# Patient Record
Sex: FEMALE | Race: BLACK OR AFRICAN AMERICAN | Employment: FULL TIME | URBAN - METROPOLITAN AREA
[De-identification: names, ages, dates, MRNs, and addresses within clinical notes are randomized per-mention and may not be internally consistent; named-entity substitution may affect disease eponyms.]

---

## 2021-10-22 ENCOUNTER — TRANSCRIBE ORDER (OUTPATIENT)
Dept: SCHEDULING | Age: 66
End: 2021-10-22

## 2021-10-22 DIAGNOSIS — M54.50 LOW BACK PAIN: Primary | ICD-10-CM

## 2021-10-28 ENCOUNTER — HOSPITAL ENCOUNTER (OUTPATIENT)
Dept: MRI IMAGING | Age: 66
Discharge: HOME | End: 2021-10-28
Attending: INTERNAL MEDICINE
Payer: COMMERCIAL

## 2021-10-28 DIAGNOSIS — M54.50 LOW BACK PAIN: ICD-10-CM

## 2021-10-28 PROCEDURE — 72148 MRI LUMBAR SPINE W/O DYE: CPT

## 2022-11-18 ENCOUNTER — TRANSCRIBE ORDER (OUTPATIENT)
Dept: SCHEDULING | Age: 67
End: 2022-11-18

## 2022-11-18 DIAGNOSIS — M54.16 LUMBAR RADICULOPATHY: ICD-10-CM

## 2022-11-18 DIAGNOSIS — M25.551 RIGHT HIP PAIN: Primary | ICD-10-CM

## 2022-12-02 ENCOUNTER — HOSPITAL ENCOUNTER (OUTPATIENT)
Dept: MRI IMAGING | Age: 67
End: 2022-12-02
Attending: INTERNAL MEDICINE
Payer: COMMERCIAL

## 2022-12-02 DIAGNOSIS — M25.551 RIGHT HIP PAIN: ICD-10-CM

## 2022-12-02 DIAGNOSIS — M54.16 LUMBAR RADICULOPATHY: ICD-10-CM

## 2022-12-02 PROCEDURE — 73721 MRI JNT OF LWR EXTRE W/O DYE: CPT

## 2022-12-02 PROCEDURE — 72148 MRI LUMBAR SPINE W/O DYE: CPT

## 2023-02-23 NOTE — PROGRESS NOTES
The patient was provided a virtual link to view the pre-operative Spine Class. A pre-operative Patient education booklet specific to spine surgery was given to the patient in PAT. The content of the class was presented using an audio power point presentation specific for patients undergoing spine surgery. Incentive spirometer and CHG bath kits were verbally reviewed. Day of surgery routine and expectations, hospital routine and expectations, nutrition, alcohol, nicotine, medications, infection control, pain management, DVT precautions and equipment, ice therapy, durable medical equipment, exercises, mobility expectations and precautions, home preparation and safety were reviewed in class. My contact information was shared with the patient to provide further information as requested by the patient related to their upcoming surgery. Received confirmation that the patient viewed spine class online via the [employer] pre-op education video survey. Questions answered via email.

## 2023-03-07 ENCOUNTER — HOSPITAL ENCOUNTER (OUTPATIENT)
Dept: PREADMISSION TESTING | Age: 68
Discharge: HOME | End: 2023-03-07

## 2023-03-07 VITALS
SYSTOLIC BLOOD PRESSURE: 138 MMHG | RESPIRATION RATE: 16 BRPM | OXYGEN SATURATION: 94 % | TEMPERATURE: 97.3 F | BODY MASS INDEX: 25.51 KG/M2 | DIASTOLIC BLOOD PRESSURE: 83 MMHG | HEART RATE: 79 BPM | WEIGHT: 158.73 LBS | HEIGHT: 66 IN

## 2023-03-07 LAB
25(OH)D3 SERPL-MCNC: 17.7 NG/ML (ref 30–100)
ABO + RH BLD: NORMAL
ALBUMIN SERPL-MCNC: 4 G/DL (ref 3.5–5)
ALBUMIN/GLOB SERPL: 1.2 (ref 1.1–2.2)
ALP SERPL-CCNC: 92 U/L (ref 45–117)
ALT SERPL-CCNC: 16 U/L (ref 12–78)
ANION GAP SERPL CALC-SCNC: 2 MMOL/L (ref 5–15)
APPEARANCE UR: CLEAR
APTT PPP: 25.6 SEC (ref 22.1–31)
AST SERPL-CCNC: 15 U/L (ref 15–37)
ATRIAL RATE: 69 BPM
BACTERIA URNS QL MICRO: NEGATIVE /HPF
BASOPHILS # BLD: 0 K/UL (ref 0–0.1)
BASOPHILS NFR BLD: 0 % (ref 0–1)
BILIRUB SERPL-MCNC: 0.3 MG/DL (ref 0.2–1)
BILIRUB UR QL: NEGATIVE
BLOOD GROUP ANTIBODIES SERPL: NORMAL
BUN SERPL-MCNC: 10 MG/DL (ref 6–20)
BUN/CREAT SERPL: 13 (ref 12–20)
CALCIUM SERPL-MCNC: 9.1 MG/DL (ref 8.5–10.1)
CALCULATED P AXIS, ECG09: 71 DEGREES
CALCULATED R AXIS, ECG10: -73 DEGREES
CALCULATED T AXIS, ECG11: 38 DEGREES
CHLORIDE SERPL-SCNC: 113 MMOL/L (ref 97–108)
CO2 SERPL-SCNC: 26 MMOL/L (ref 21–32)
COLOR UR: NORMAL
CREAT SERPL-MCNC: 0.8 MG/DL (ref 0.55–1.02)
DIAGNOSIS, 93000: NORMAL
DIFFERENTIAL METHOD BLD: ABNORMAL
EOSINOPHIL # BLD: 0.1 K/UL (ref 0–0.4)
EOSINOPHIL NFR BLD: 2 % (ref 0–7)
EPITH CASTS URNS QL MICRO: NORMAL /LPF
ERYTHROCYTE [DISTWIDTH] IN BLOOD BY AUTOMATED COUNT: 12.6 % (ref 11.5–14.5)
EST. AVERAGE GLUCOSE BLD GHB EST-MCNC: 108 MG/DL
GLOBULIN SER CALC-MCNC: 3.3 G/DL (ref 2–4)
GLUCOSE SERPL-MCNC: 95 MG/DL (ref 65–100)
GLUCOSE UR STRIP.AUTO-MCNC: NEGATIVE MG/DL
HBA1C MFR BLD: 5.4 % (ref 4–5.6)
HCT VFR BLD AUTO: 45.1 % (ref 35–47)
HGB BLD-MCNC: 15 G/DL (ref 11.5–16)
HGB UR QL STRIP: NEGATIVE
HYALINE CASTS URNS QL MICRO: NORMAL /LPF (ref 0–2)
IMM GRANULOCYTES # BLD AUTO: 0 K/UL (ref 0–0.04)
IMM GRANULOCYTES NFR BLD AUTO: 0 % (ref 0–0.5)
INR PPP: 1 (ref 0.9–1.1)
KETONES UR QL STRIP.AUTO: NEGATIVE MG/DL
LEUKOCYTE ESTERASE UR QL STRIP.AUTO: NEGATIVE
LYMPHOCYTES # BLD: 2.6 K/UL (ref 0.8–3.5)
LYMPHOCYTES NFR BLD: 52 % (ref 12–49)
MCH RBC QN AUTO: 35 PG (ref 26–34)
MCHC RBC AUTO-ENTMCNC: 33.3 G/DL (ref 30–36.5)
MCV RBC AUTO: 105.1 FL (ref 80–99)
MONOCYTES # BLD: 0.6 K/UL (ref 0–1)
MONOCYTES NFR BLD: 12 % (ref 5–13)
NEUTS SEG # BLD: 1.7 K/UL (ref 1.8–8)
NEUTS SEG NFR BLD: 34 % (ref 32–75)
NITRITE UR QL STRIP.AUTO: NEGATIVE
NRBC # BLD: 0 K/UL (ref 0–0.01)
NRBC BLD-RTO: 0 PER 100 WBC
P-R INTERVAL, ECG05: 202 MS
PH UR STRIP: 7 (ref 5–8)
PLATELET # BLD AUTO: 217 K/UL (ref 150–400)
PMV BLD AUTO: 8.9 FL (ref 8.9–12.9)
POTASSIUM SERPL-SCNC: 4.4 MMOL/L (ref 3.5–5.1)
PROT SERPL-MCNC: 7.3 G/DL (ref 6.4–8.2)
PROT UR STRIP-MCNC: NEGATIVE MG/DL
PROTHROMBIN TIME: 10.3 SEC (ref 9–11.1)
Q-T INTERVAL, ECG07: 378 MS
QRS DURATION, ECG06: 104 MS
QTC CALCULATION (BEZET), ECG08: 405 MS
RBC # BLD AUTO: 4.29 M/UL (ref 3.8–5.2)
RBC #/AREA URNS HPF: NORMAL /HPF (ref 0–5)
SODIUM SERPL-SCNC: 141 MMOL/L (ref 136–145)
SP GR UR REFRACTOMETRY: 1.01 (ref 1–1.03)
SPECIMEN EXP DATE BLD: NORMAL
THERAPEUTIC RANGE,PTTT: NORMAL SECS (ref 58–77)
UA: UC IF INDICATED,UAUC: NORMAL
UROBILINOGEN UR QL STRIP.AUTO: 0.2 EU/DL (ref 0.2–1)
VENTRICULAR RATE, ECG03: 69 BPM
WBC # BLD AUTO: 5 K/UL (ref 3.6–11)
WBC URNS QL MICRO: NORMAL /HPF (ref 0–4)

## 2023-03-07 PROCEDURE — 85025 COMPLETE CBC W/AUTO DIFF WBC: CPT

## 2023-03-07 PROCEDURE — 93005 ELECTROCARDIOGRAM TRACING: CPT

## 2023-03-07 PROCEDURE — 82306 VITAMIN D 25 HYDROXY: CPT

## 2023-03-07 PROCEDURE — 85730 THROMBOPLASTIN TIME PARTIAL: CPT

## 2023-03-07 PROCEDURE — 36415 COLL VENOUS BLD VENIPUNCTURE: CPT

## 2023-03-07 PROCEDURE — 85610 PROTHROMBIN TIME: CPT

## 2023-03-07 PROCEDURE — 80053 COMPREHEN METABOLIC PANEL: CPT

## 2023-03-07 PROCEDURE — 86900 BLOOD TYPING SEROLOGIC ABO: CPT

## 2023-03-07 PROCEDURE — 83036 HEMOGLOBIN GLYCOSYLATED A1C: CPT

## 2023-03-07 PROCEDURE — 81001 URINALYSIS AUTO W/SCOPE: CPT

## 2023-03-07 RX ORDER — ESCITALOPRAM OXALATE 10 MG/1
10 TABLET ORAL DAILY
COMMUNITY

## 2023-03-07 RX ORDER — IBUPROFEN 600 MG/1
TABLET ORAL 3 TIMES DAILY
COMMUNITY

## 2023-03-07 RX ORDER — CHOLECALCIFEROL TAB 125 MCG (5000 UNIT) 125 MCG
10000 TAB ORAL DAILY
Qty: 60 TABLET | Refills: 0 | Status: SHIPPED
Start: 2023-03-07 | End: 2023-04-06

## 2023-03-07 RX ORDER — BUTALBITAL AND ACETAMINOPHEN 325; 50 MG/1; MG/1
1 TABLET ORAL AS NEEDED
COMMUNITY

## 2023-03-07 RX ORDER — MELATONIN
1000 DAILY
COMMUNITY

## 2023-03-07 RX ORDER — AMLODIPINE BESYLATE 5 MG/1
5 TABLET ORAL DAILY
COMMUNITY

## 2023-03-07 RX ORDER — GABAPENTIN 300 MG/1
300 CAPSULE ORAL 2 TIMES DAILY
COMMUNITY

## 2023-03-07 NOTE — PERIOP NOTES
[address] , [address] [facility]   MAIN OR                                  [phone]   MAIN PRE OP                          [phone]                                                                                AMBULATORY PRE OP          [phone]  PRE-ADMISSION TESTING    [phone]   Surgery Date:  3/20/23       Is surgery arrival time given by surgeon? YES  NO  If NO, [facility] staff will call you between 3 and 7pm the day before your surgery with your arrival time. (If your surgery is on a Monday, we will call you the Friday before.)    Call [phone] after 7pm Monday-Friday if you did not receive this call. INSTRUCTIONS BEFORE YOUR SURGERY   When You  Arrive Arrive at the [address] [facility] on the day of your surgery  Have your insurance card, photo ID, and any copayment (if needed)   Food   and   Drink NO food or drink after midnight the night before surgery    This means NO water, gum, mints, coffee, juice, etc.  No alcohol (beer, wine, liquor) 24 hours before and after surgery   Medications to   TAKE   Morning of Surgery MEDICATIONS TO TAKE THE MORNING OF SURGERY WITH A SIP OF WATER:   Amlodipine  Escitalopram  neurontin   Medications  To  STOP      7 days before surgery Non-Steroidal anti-inflammatory Drugs (NSAID's): for example, Ibuprofen (Advil, Motrin), Naproxen (Aleve)  Aspirin, if taking for pain   Herbal supplements, vitamins, and fish oil  Other:  (Pain medications not listed above, including Tylenol may be taken)   Blood  Thinners If you take  Aspirin, Plavix, Coumadin, or any blood-thinning or anti-blood clot medicine, talk to the doctor who prescribed the medications for pre-operative instructions.    Bathing Clothing  Jewelry  Valuables     If you shower the morning of surgery, please do not apply anything to your skin (lotions, powders, deodorant, or makeup, especially mascara)  Follow Chlorhexidine Care Fusion body wash instructions provided to you during PAT appointment. Begin 3 days prior to surgery. Do not shave or trim anywhere 24 hours before surgery  Wear your hair loose or down; no pony-tails, buns, or metal hair clips  Wear loose, comfortable, clean clothes  Wear glasses instead of contacts  Leave money, valuables, and jewelry, including body piercings, at home  If you were given an [employer], bring it on day of surgery. Going Home - or Spending the Night SAME-DAY SURGERY: You must have a responsible adult drive you home and stay with you 24 hours after surgery  ADMITS: If your doctor is keeping you in the hospital after surgery, leave personal belongings/luggage in your car until you have a hospital room number. Hospital discharge time is 12 noon  Drivers must be here before 12 noon unless you are told differently   Special Instructions Please bring covid bvaccine card day of surgery       Follow all instructions so your surgery wont be cancelled. Please, be on time. If a situation occurs and you are delayed the day of surgery, call [phone]    If your physical condition changes (like a fever, cold, flu, etc.) call your surgeon. Home medication(s) reviewed and verified via     LIST   VERBAL   during PAT appointment. The patient was contacted by     IN-PERSON  The patient verbalizes understanding of all instructions and      DOES NOT   need reinforcement.

## 2023-03-07 NOTE — PERIOP NOTES
Vitamin D level 17.7 in PAT; patient to start 81665 units of Vitamin D daily for 30 days; available OTC.

## 2023-03-08 LAB
BACTERIA SPEC CULT: NORMAL
BACTERIA SPEC CULT: NORMAL
SERVICE CMNT-IMP: NORMAL

## 2023-03-10 NOTE — PERIOP NOTES
Per request of Paul Chen, patient called and informed of Vit D level. Instructed to take 10,000 unit daily for 30 days. Verbalized understanding. Will follow up with PCP at next visit.

## 2023-03-17 ENCOUNTER — ANESTHESIA EVENT (OUTPATIENT)
Dept: SURGERY | Age: 68
DRG: 453 | End: 2023-03-17
Payer: COMMERCIAL

## 2023-03-20 ENCOUNTER — HOSPITAL ENCOUNTER (INPATIENT)
Age: 68
LOS: 5 days | Discharge: HOME HEALTH | DRG: 453 | End: 2023-03-25
Attending: ORTHOPAEDIC SURGERY | Admitting: ORTHOPAEDIC SURGERY
Payer: COMMERCIAL

## 2023-03-20 ENCOUNTER — ANESTHESIA (OUTPATIENT)
Dept: SURGERY | Age: 68
DRG: 453 | End: 2023-03-20
Payer: COMMERCIAL

## 2023-03-20 ENCOUNTER — APPOINTMENT (OUTPATIENT)
Dept: GENERAL RADIOLOGY | Age: 68
DRG: 453 | End: 2023-03-20
Attending: ORTHOPAEDIC SURGERY
Payer: COMMERCIAL

## 2023-03-20 DIAGNOSIS — G89.18 POSTOPERATIVE PAIN: ICD-10-CM

## 2023-03-20 DIAGNOSIS — Z98.1 STATUS POST LUMBAR SPINAL FUSION: Primary | ICD-10-CM

## 2023-03-20 PROBLEM — M48.061 LUMBAR STENOSIS: Status: ACTIVE | Noted: 2023-03-20

## 2023-03-20 PROCEDURE — 77030026438 HC STYL ET INTUB CARD -A: Performed by: NURSE ANESTHETIST, CERTIFIED REGISTERED

## 2023-03-20 PROCEDURE — 74011250636 HC RX REV CODE- 250/636: Performed by: NURSE ANESTHETIST, CERTIFIED REGISTERED

## 2023-03-20 PROCEDURE — 0SB20ZZ EXCISION OF LUMBAR VERTEBRAL DISC, OPEN APPROACH: ICD-10-PCS | Performed by: ORTHOPAEDIC SURGERY

## 2023-03-20 PROCEDURE — 77030004391 HC BUR FLUT MEDT -C: Performed by: ORTHOPAEDIC SURGERY

## 2023-03-20 PROCEDURE — 65270000029 HC RM PRIVATE

## 2023-03-20 PROCEDURE — C1713 ANCHOR/SCREW BN/BN,TIS/BN: HCPCS | Performed by: ORTHOPAEDIC SURGERY

## 2023-03-20 PROCEDURE — 77030005513 HC CATH URETH FOL11 MDII -B: Performed by: ORTHOPAEDIC SURGERY

## 2023-03-20 PROCEDURE — 77030038692 HC WND DEB SYS IRMX -B: Performed by: ORTHOPAEDIC SURGERY

## 2023-03-20 PROCEDURE — 01NB0ZZ RELEASE LUMBAR NERVE, OPEN APPROACH: ICD-10-PCS | Performed by: ORTHOPAEDIC SURGERY

## 2023-03-20 PROCEDURE — C1821 INTERSPINOUS IMPLANT: HCPCS | Performed by: ORTHOPAEDIC SURGERY

## 2023-03-20 PROCEDURE — 74011000250 HC RX REV CODE- 250: Performed by: ORTHOPAEDIC SURGERY

## 2023-03-20 PROCEDURE — 77030026188 HC BN CANC CHP CRSH PR LIFV -E: Performed by: ORTHOPAEDIC SURGERY

## 2023-03-20 PROCEDURE — C1762 CONN TISS, HUMAN(INC FASCIA): HCPCS | Performed by: ORTHOPAEDIC SURGERY

## 2023-03-20 PROCEDURE — 77030031139 HC SUT VCRL2 J&J -A: Performed by: ORTHOPAEDIC SURGERY

## 2023-03-20 PROCEDURE — 74011250637 HC RX REV CODE- 250/637: Performed by: ORTHOPAEDIC SURGERY

## 2023-03-20 PROCEDURE — 74011250636 HC RX REV CODE- 250/636: Performed by: ORTHOPAEDIC SURGERY

## 2023-03-20 PROCEDURE — C1768 GRAFT, VASCULAR: HCPCS | Performed by: ORTHOPAEDIC SURGERY

## 2023-03-20 PROCEDURE — 77030040361 HC SLV COMPR DVT MDII -B

## 2023-03-20 PROCEDURE — 0SG10AJ FUSION OF 2 OR MORE LUMBAR VERTEBRAL JOINTS WITH INTERBODY FUSION DEVICE, POSTERIOR APPROACH, ANTERIOR COLUMN, OPEN APPROACH: ICD-10-PCS | Performed by: ORTHOPAEDIC SURGERY

## 2023-03-20 PROCEDURE — 76010000175 HC OR TIME 4 TO 4.5 HR INTENSV-TIER 1: Performed by: ORTHOPAEDIC SURGERY

## 2023-03-20 PROCEDURE — 77030040922 HC BLNKT HYPOTHRM STRY -A

## 2023-03-20 PROCEDURE — 77030010507 HC ADH SKN DERMBND J&J -B: Performed by: ORTHOPAEDIC SURGERY

## 2023-03-20 PROCEDURE — 77030013079 HC BLNKT BAIR HGGR 3M -A: Performed by: NURSE ANESTHETIST, CERTIFIED REGISTERED

## 2023-03-20 PROCEDURE — C9290 INJ, BUPIVACAINE LIPOSOME: HCPCS | Performed by: ORTHOPAEDIC SURGERY

## 2023-03-20 PROCEDURE — 76210000006 HC OR PH I REC 0.5 TO 1 HR: Performed by: ORTHOPAEDIC SURGERY

## 2023-03-20 PROCEDURE — 77030020061 HC IV BLD WRMR ADMIN SET 3M -B: Performed by: NURSE ANESTHETIST, CERTIFIED REGISTERED

## 2023-03-20 PROCEDURE — 77030033067 HC SUT PDO STRATFX SPIR J&J -B: Performed by: ORTHOPAEDIC SURGERY

## 2023-03-20 PROCEDURE — 77030012406 HC DRN WND PENRS BARD -A: Performed by: ORTHOPAEDIC SURGERY

## 2023-03-20 PROCEDURE — 74011000250 HC RX REV CODE- 250: Performed by: NURSE ANESTHETIST, CERTIFIED REGISTERED

## 2023-03-20 PROCEDURE — 77030002982 HC SUT POLYSRB J&J -A: Performed by: ORTHOPAEDIC SURGERY

## 2023-03-20 PROCEDURE — 0SG1071 FUSION OF 2 OR MORE LUMBAR VERTEBRAL JOINTS WITH AUTOLOGOUS TISSUE SUBSTITUTE, POSTERIOR APPROACH, POSTERIOR COLUMN, OPEN APPROACH: ICD-10-PCS | Performed by: ORTHOPAEDIC SURGERY

## 2023-03-20 PROCEDURE — 74011250636 HC RX REV CODE- 250/636: Performed by: ANESTHESIOLOGY

## 2023-03-20 PROCEDURE — 77030008684 HC TU ET CUF COVD -B: Performed by: NURSE ANESTHETIST, CERTIFIED REGISTERED

## 2023-03-20 PROCEDURE — 0SG10K1 FUSION OF 2 OR MORE LUMBAR VERTEBRAL JOINTS WITH NONAUTOLOGOUS TISSUE SUBSTITUTE, POSTERIOR APPROACH, POSTERIOR COLUMN, OPEN APPROACH: ICD-10-PCS | Performed by: ORTHOPAEDIC SURGERY

## 2023-03-20 PROCEDURE — 77030028271 HC SRGFL HEMSTAT MTRX KT J&J -C: Performed by: ORTHOPAEDIC SURGERY

## 2023-03-20 PROCEDURE — 2709999900 HC NON-CHARGEABLE SUPPLY: Performed by: ORTHOPAEDIC SURGERY

## 2023-03-20 PROCEDURE — 76060000039 HC ANESTHESIA 4 TO 4.5 HR: Performed by: ORTHOPAEDIC SURGERY

## 2023-03-20 DEVICE — GRAFT BONE SUBSTITUTE 5CC BIOACTIVE NANOSS: Type: IMPLANTABLE DEVICE | Site: SPINE LUMBAR | Status: FUNCTIONAL

## 2023-03-20 DEVICE — ALLOGRAFT BNE MOLD 10 CC VIABLE BNE MTRX VIBONE: Type: IMPLANTABLE DEVICE | Site: SPINE LUMBAR | Status: FUNCTIONAL

## 2023-03-20 DEVICE — SCR BNE SPNE 6.5X50MM TI -- STREAMLINE TL: Type: IMPLANTABLE DEVICE | Site: SPINE LUMBAR | Status: FUNCTIONAL

## 2023-03-20 DEVICE — BONE CHIP CANC CRSH 1-8MM 30ML --: Type: IMPLANTABLE DEVICE | Site: SPINE LUMBAR | Status: FUNCTIONAL

## 2023-03-20 DEVICE — SCR BNE SPNE 6.5X45MM TI -- STREAMLINE TL: Type: IMPLANTABLE DEVICE | Site: SPINE LUMBAR | Status: FUNCTIONAL

## 2023-03-20 DEVICE — INTEGRA® DURAFLEX™ SUTURABLE DURAL GRAFT 4 CM X 5 CM
Type: IMPLANTABLE DEVICE | Site: SPINE LUMBAR | Status: FUNCTIONAL
Brand: INTEGRA® DURAFLEX®

## 2023-03-20 DEVICE — SCR SET SPNE STREAMLINE TL --: Type: IMPLANTABLE DEVICE | Site: SPINE LUMBAR | Status: FUNCTIONAL

## 2023-03-20 DEVICE — SPACER SPNL VERT BULL TIP 11 X 22 MM: Type: IMPLANTABLE DEVICE | Site: SPINE LUMBAR | Status: FUNCTIONAL

## 2023-03-20 DEVICE — ROD, TI, PREBENT, 5.5X65
Type: IMPLANTABLE DEVICE | Site: SPINE LUMBAR | Status: FUNCTIONAL
Brand: CORTERA

## 2023-03-20 RX ORDER — FACIAL-BODY WIPES
10 EACH TOPICAL DAILY PRN
Status: DISCONTINUED | OUTPATIENT
Start: 2023-03-22 | End: 2023-03-25 | Stop reason: HOSPADM

## 2023-03-20 RX ORDER — DIPHENHYDRAMINE HYDROCHLORIDE 50 MG/ML
12.5 INJECTION, SOLUTION INTRAMUSCULAR; INTRAVENOUS
Status: DISCONTINUED | OUTPATIENT
Start: 2023-03-20 | End: 2023-03-25 | Stop reason: HOSPADM

## 2023-03-20 RX ORDER — SODIUM CHLORIDE, SODIUM LACTATE, POTASSIUM CHLORIDE, CALCIUM CHLORIDE 600; 310; 30; 20 MG/100ML; MG/100ML; MG/100ML; MG/100ML
75 INJECTION, SOLUTION INTRAVENOUS CONTINUOUS
Status: DISCONTINUED | OUTPATIENT
Start: 2023-03-20 | End: 2023-03-20 | Stop reason: HOSPADM

## 2023-03-20 RX ORDER — SODIUM CHLORIDE, SODIUM LACTATE, POTASSIUM CHLORIDE, CALCIUM CHLORIDE 600; 310; 30; 20 MG/100ML; MG/100ML; MG/100ML; MG/100ML
INJECTION, SOLUTION INTRAVENOUS
Status: DISCONTINUED | OUTPATIENT
Start: 2023-03-20 | End: 2023-03-20 | Stop reason: HOSPADM

## 2023-03-20 RX ORDER — POLYETHYLENE GLYCOL 3350 17 G/17G
17 POWDER, FOR SOLUTION ORAL DAILY
Status: DISCONTINUED | OUTPATIENT
Start: 2023-03-21 | End: 2023-03-25 | Stop reason: HOSPADM

## 2023-03-20 RX ORDER — ONDANSETRON 2 MG/ML
4 INJECTION INTRAMUSCULAR; INTRAVENOUS AS NEEDED
Status: DISCONTINUED | OUTPATIENT
Start: 2023-03-20 | End: 2023-03-20 | Stop reason: HOSPADM

## 2023-03-20 RX ORDER — MIDAZOLAM HYDROCHLORIDE 1 MG/ML
INJECTION, SOLUTION INTRAMUSCULAR; INTRAVENOUS AS NEEDED
Status: DISCONTINUED | OUTPATIENT
Start: 2023-03-20 | End: 2023-03-20 | Stop reason: HOSPADM

## 2023-03-20 RX ORDER — SODIUM CHLORIDE 0.9 % (FLUSH) 0.9 %
5-40 SYRINGE (ML) INJECTION EVERY 8 HOURS
Status: DISCONTINUED | OUTPATIENT
Start: 2023-03-20 | End: 2023-03-25 | Stop reason: HOSPADM

## 2023-03-20 RX ORDER — GABAPENTIN 300 MG/1
300 CAPSULE ORAL 2 TIMES DAILY
Status: DISCONTINUED | OUTPATIENT
Start: 2023-03-20 | End: 2023-03-25 | Stop reason: HOSPADM

## 2023-03-20 RX ORDER — PHENYLEPHRINE HCL IN 0.9% NACL 0.4MG/10ML
SYRINGE (ML) INTRAVENOUS
Status: DISCONTINUED | OUTPATIENT
Start: 2023-03-20 | End: 2023-03-20 | Stop reason: HOSPADM

## 2023-03-20 RX ORDER — ESCITALOPRAM OXALATE 10 MG/1
10 TABLET ORAL DAILY
Status: DISCONTINUED | OUTPATIENT
Start: 2023-03-21 | End: 2023-03-25 | Stop reason: HOSPADM

## 2023-03-20 RX ORDER — AMOXICILLIN 250 MG
1 CAPSULE ORAL 2 TIMES DAILY
Status: DISCONTINUED | OUTPATIENT
Start: 2023-03-20 | End: 2023-03-25 | Stop reason: HOSPADM

## 2023-03-20 RX ORDER — NALOXONE HYDROCHLORIDE 0.4 MG/ML
0.4 INJECTION, SOLUTION INTRAMUSCULAR; INTRAVENOUS; SUBCUTANEOUS AS NEEDED
Status: DISCONTINUED | OUTPATIENT
Start: 2023-03-20 | End: 2023-03-25 | Stop reason: HOSPADM

## 2023-03-20 RX ORDER — SODIUM CHLORIDE 0.9 % (FLUSH) 0.9 %
5-40 SYRINGE (ML) INJECTION AS NEEDED
Status: DISCONTINUED | OUTPATIENT
Start: 2023-03-20 | End: 2023-03-25 | Stop reason: HOSPADM

## 2023-03-20 RX ORDER — PROPOFOL 10 MG/ML
INJECTION, EMULSION INTRAVENOUS
Status: DISCONTINUED | OUTPATIENT
Start: 2023-03-20 | End: 2023-03-20 | Stop reason: HOSPADM

## 2023-03-20 RX ORDER — CYCLOBENZAPRINE HCL 10 MG
10 TABLET ORAL
Status: DISCONTINUED | OUTPATIENT
Start: 2023-03-20 | End: 2023-03-25 | Stop reason: HOSPADM

## 2023-03-20 RX ORDER — HYDROMORPHONE HYDROCHLORIDE 2 MG/ML
INJECTION, SOLUTION INTRAMUSCULAR; INTRAVENOUS; SUBCUTANEOUS AS NEEDED
Status: DISCONTINUED | OUTPATIENT
Start: 2023-03-20 | End: 2023-03-20 | Stop reason: HOSPADM

## 2023-03-20 RX ORDER — SODIUM CHLORIDE, SODIUM LACTATE, POTASSIUM CHLORIDE, CALCIUM CHLORIDE 600; 310; 30; 20 MG/100ML; MG/100ML; MG/100ML; MG/100ML
125 INJECTION, SOLUTION INTRAVENOUS CONTINUOUS
Status: DISCONTINUED | OUTPATIENT
Start: 2023-03-20 | End: 2023-03-20

## 2023-03-20 RX ORDER — SODIUM CHLORIDE 9 MG/ML
125 INJECTION, SOLUTION INTRAVENOUS CONTINUOUS
Status: DISPENSED | OUTPATIENT
Start: 2023-03-20 | End: 2023-03-21

## 2023-03-20 RX ORDER — EPHEDRINE SULFATE/0.9% NACL/PF 50 MG/5 ML
SYRINGE (ML) INTRAVENOUS AS NEEDED
Status: DISCONTINUED | OUTPATIENT
Start: 2023-03-20 | End: 2023-03-20 | Stop reason: HOSPADM

## 2023-03-20 RX ORDER — CHOLECALCIFEROL TAB 125 MCG (5000 UNIT) 125 MCG
10000 TAB ORAL DAILY
Status: DISCONTINUED | OUTPATIENT
Start: 2023-03-21 | End: 2023-03-25 | Stop reason: HOSPADM

## 2023-03-20 RX ORDER — DEXAMETHASONE SODIUM PHOSPHATE 4 MG/ML
INJECTION, SOLUTION INTRA-ARTICULAR; INTRALESIONAL; INTRAMUSCULAR; INTRAVENOUS; SOFT TISSUE AS NEEDED
Status: DISCONTINUED | OUTPATIENT
Start: 2023-03-20 | End: 2023-03-20 | Stop reason: HOSPADM

## 2023-03-20 RX ORDER — HYDROMORPHONE HYDROCHLORIDE 2 MG/1
4 TABLET ORAL
Status: DISCONTINUED | OUTPATIENT
Start: 2023-03-20 | End: 2023-03-25 | Stop reason: HOSPADM

## 2023-03-20 RX ORDER — ONDANSETRON 2 MG/ML
4 INJECTION INTRAMUSCULAR; INTRAVENOUS
Status: DISCONTINUED | OUTPATIENT
Start: 2023-03-20 | End: 2023-03-21

## 2023-03-20 RX ORDER — ACETAMINOPHEN 325 MG/1
650 TABLET ORAL
Status: DISCONTINUED | OUTPATIENT
Start: 2023-03-20 | End: 2023-03-25 | Stop reason: HOSPADM

## 2023-03-20 RX ORDER — HYDROMORPHONE HYDROCHLORIDE 1 MG/ML
.25-1 INJECTION, SOLUTION INTRAMUSCULAR; INTRAVENOUS; SUBCUTANEOUS
Status: DISCONTINUED | OUTPATIENT
Start: 2023-03-20 | End: 2023-03-20 | Stop reason: HOSPADM

## 2023-03-20 RX ORDER — HYDROMORPHONE HYDROCHLORIDE 2 MG/1
2 TABLET ORAL
Status: DISCONTINUED | OUTPATIENT
Start: 2023-03-20 | End: 2023-03-25 | Stop reason: HOSPADM

## 2023-03-20 RX ORDER — VANCOMYCIN HYDROCHLORIDE 1 G/20ML
INJECTION, POWDER, LYOPHILIZED, FOR SOLUTION INTRAVENOUS AS NEEDED
Status: DISCONTINUED | OUTPATIENT
Start: 2023-03-20 | End: 2023-03-20 | Stop reason: HOSPADM

## 2023-03-20 RX ORDER — DIPHENHYDRAMINE HYDROCHLORIDE 50 MG/ML
12.5 INJECTION, SOLUTION INTRAMUSCULAR; INTRAVENOUS AS NEEDED
Status: DISCONTINUED | OUTPATIENT
Start: 2023-03-20 | End: 2023-03-20 | Stop reason: HOSPADM

## 2023-03-20 RX ORDER — ROCURONIUM BROMIDE 10 MG/ML
INJECTION, SOLUTION INTRAVENOUS AS NEEDED
Status: DISCONTINUED | OUTPATIENT
Start: 2023-03-20 | End: 2023-03-20 | Stop reason: HOSPADM

## 2023-03-20 RX ORDER — LIDOCAINE HYDROCHLORIDE 10 MG/ML
0.1 INJECTION, SOLUTION EPIDURAL; INFILTRATION; INTRACAUDAL; PERINEURAL AS NEEDED
Status: DISCONTINUED | OUTPATIENT
Start: 2023-03-20 | End: 2023-03-20 | Stop reason: HOSPADM

## 2023-03-20 RX ORDER — MELATONIN
1000 DAILY
Status: DISCONTINUED | OUTPATIENT
Start: 2023-03-21 | End: 2023-03-20

## 2023-03-20 RX ORDER — SUCCINYLCHOLINE CHLORIDE 20 MG/ML
INJECTION INTRAMUSCULAR; INTRAVENOUS AS NEEDED
Status: DISCONTINUED | OUTPATIENT
Start: 2023-03-20 | End: 2023-03-20 | Stop reason: HOSPADM

## 2023-03-20 RX ORDER — AMLODIPINE BESYLATE 5 MG/1
5 TABLET ORAL DAILY
Status: DISCONTINUED | OUTPATIENT
Start: 2023-03-21 | End: 2023-03-25 | Stop reason: HOSPADM

## 2023-03-20 RX ORDER — HYDROMORPHONE HYDROCHLORIDE 1 MG/ML
0.5 INJECTION, SOLUTION INTRAMUSCULAR; INTRAVENOUS; SUBCUTANEOUS
Status: ACTIVE | OUTPATIENT
Start: 2023-03-20 | End: 2023-03-21

## 2023-03-20 RX ORDER — ONDANSETRON 2 MG/ML
INJECTION INTRAMUSCULAR; INTRAVENOUS AS NEEDED
Status: DISCONTINUED | OUTPATIENT
Start: 2023-03-20 | End: 2023-03-20 | Stop reason: HOSPADM

## 2023-03-20 RX ORDER — FAMOTIDINE 20 MG/1
20 TABLET, FILM COATED ORAL 2 TIMES DAILY
Status: DISCONTINUED | OUTPATIENT
Start: 2023-03-20 | End: 2023-03-25 | Stop reason: HOSPADM

## 2023-03-20 RX ADMIN — Medication 20 MCG/MIN: at 13:40

## 2023-03-20 RX ADMIN — ONDANSETRON HYDROCHLORIDE 4 MG: 2 SOLUTION INTRAMUSCULAR; INTRAVENOUS at 16:45

## 2023-03-20 RX ADMIN — Medication 20 MG: at 13:30

## 2023-03-20 RX ADMIN — Medication 10 ML: at 23:06

## 2023-03-20 RX ADMIN — DEXAMETHASONE SODIUM PHOSPHATE 8 MG: 4 INJECTION, SOLUTION INTRAMUSCULAR; INTRAVENOUS at 13:38

## 2023-03-20 RX ADMIN — SENNOSIDES AND DOCUSATE SODIUM 1 TABLET: 50; 8.6 TABLET ORAL at 19:38

## 2023-03-20 RX ADMIN — MIDAZOLAM HYDROCHLORIDE 2 MG: 1 INJECTION, SOLUTION INTRAMUSCULAR; INTRAVENOUS at 13:08

## 2023-03-20 RX ADMIN — Medication: at 18:41

## 2023-03-20 RX ADMIN — SODIUM CHLORIDE, SODIUM LACTATE, POTASSIUM CHLORIDE, CALCIUM CHLORIDE: 600; 310; 30; 20 INJECTION, SOLUTION INTRAVENOUS at 17:05

## 2023-03-20 RX ADMIN — HYDROMORPHONE HYDROCHLORIDE 0.5 MG: 1 INJECTION, SOLUTION INTRAMUSCULAR; INTRAVENOUS; SUBCUTANEOUS at 17:37

## 2023-03-20 RX ADMIN — GABAPENTIN 300 MG: 300 CAPSULE ORAL at 19:38

## 2023-03-20 RX ADMIN — ROCURONIUM BROMIDE 40 MG: 10 INJECTION INTRAVENOUS at 13:27

## 2023-03-20 RX ADMIN — CEFAZOLIN 2 G: 1 INJECTION, POWDER, FOR SOLUTION INTRAMUSCULAR; INTRAVENOUS at 23:02

## 2023-03-20 RX ADMIN — PROPOFOL 75 MCG/KG/MIN: 10 INJECTION, EMULSION INTRAVENOUS at 13:39

## 2023-03-20 RX ADMIN — SODIUM CHLORIDE 125 ML/HR: 9 INJECTION, SOLUTION INTRAVENOUS at 18:33

## 2023-03-20 RX ADMIN — SUCCINYLCHOLINE CHLORIDE 100 MG: 20 INJECTION, SOLUTION INTRAMUSCULAR; INTRAVENOUS at 13:15

## 2023-03-20 RX ADMIN — ROCURONIUM BROMIDE 10 MG: 10 INJECTION INTRAVENOUS at 13:15

## 2023-03-20 RX ADMIN — FAMOTIDINE 20 MG: 20 TABLET, FILM COATED ORAL at 19:38

## 2023-03-20 RX ADMIN — HYDROMORPHONE HYDROCHLORIDE 1 MG: 2 INJECTION, SOLUTION INTRAMUSCULAR; INTRAVENOUS; SUBCUTANEOUS at 13:08

## 2023-03-20 RX ADMIN — ONDANSETRON 4 MG: 2 INJECTION INTRAMUSCULAR; INTRAVENOUS at 19:47

## 2023-03-20 RX ADMIN — SODIUM CHLORIDE, POTASSIUM CHLORIDE, SODIUM LACTATE AND CALCIUM CHLORIDE: 600; 310; 30; 20 INJECTION, SOLUTION INTRAVENOUS at 13:20

## 2023-03-20 RX ADMIN — SODIUM CHLORIDE, POTASSIUM CHLORIDE, SODIUM LACTATE AND CALCIUM CHLORIDE 75 ML/HR: 600; 310; 30; 20 INJECTION, SOLUTION INTRAVENOUS at 11:03

## 2023-03-20 RX ADMIN — PROPOFOL 150 MG: 10 INJECTION, EMULSION INTRAVENOUS at 13:15

## 2023-03-20 RX ADMIN — Medication 10 MG: at 16:19

## 2023-03-20 RX ADMIN — SODIUM CHLORIDE, SODIUM LACTATE, POTASSIUM CHLORIDE, CALCIUM CHLORIDE: 600; 310; 30; 20 INJECTION, SOLUTION INTRAVENOUS at 13:00

## 2023-03-20 RX ADMIN — CEFAZOLIN SODIUM 2 G: 1 POWDER, FOR SOLUTION INTRAMUSCULAR; INTRAVENOUS at 13:45

## 2023-03-20 RX ADMIN — Medication 20 MG: at 14:07

## 2023-03-20 NOTE — ANESTHESIA PREPROCEDURE EVALUATION
Relevant Problems   No relevant active problems       Anesthetic History   No history of anesthetic complications            Review of Systems / Medical History  Patient summary reviewed, nursing notes reviewed and pertinent labs reviewed    Pulmonary  Within defined limits                 Neuro/Psych         Headaches and psychiatric history    Comments: PAIN = 5/10   Low back to left foot (also right hip)  Anxiety/depression Cardiovascular  Within defined limits                Exercise tolerance: >4 METS     GI/Hepatic/Renal  Within defined limits              Endo/Other        Arthritis     Other Findings              Physical Exam    Airway  Mallampati: III    Neck ROM: normal range of motion   Mouth opening: Normal     Cardiovascular    Rhythm: regular  Rate: normal         Dental    Dentition: Upper partial plate, Lower partial plate and Bridges     Pulmonary  Breath sounds clear to auscultation               Abdominal         Other Findings            Anesthetic Plan    ASA: 2  Anesthesia type: general          Induction: Intravenous  Anesthetic plan and risks discussed with: Patient      Informed consent obtained.

## 2023-03-20 NOTE — H&P
Date of Surgery Update:  Marcela Alfred was seen and examined. History and physical has been reviewed. The patient has been examined.  There have been no significant clinical changes since the completion of the originally dated History and Physical.    Signed By: Bebe Bowling MD     March 20, 2023 11:06 AM

## 2023-03-20 NOTE — OP NOTES
Operative Note    Patient: Naveen Jones  YOB: 1955  MRN: [id number]    Date of Procedure: 3/20/2023     Pre-Op Diagnosis: LUMBAR SPINE PAIN  SPONDYLOLISTHESIS, LUMBAR REGION  SPINAL STENOSIS OF LUMBAR REGION W/ NEUROGENIC CLAUDICATION    Post-Op Diagnosis: Same as preoperative diagnosis. Procedure(s):  L3-L5 LAMINECTOMY/FUSION/INSTRUMENTATION/TLIF/BONE GRAFT    Surgeon(s):  Sarah Mckinney MD    Surgical Assistant: Surg Asst-1: Tanner Salas    Anesthesia: General     Estimated Blood Loss (mL):  741     Complications: None    Specimens: * No specimens in log *     Implants:   Implant Name Type Inv.  Item Serial No.  Lot No. LRB No. Used Action   BONE CHIP CANC [address] 1-8MM 30ML --  - [id number]  BONE CHIP CANC [address] 1-8MM 30ML --  [id number] [facility] TISSUE BANK NA N/A 1 Implanted   ALLOGRAFT BNE MOLD 10 CC VIABLE BNE MTRX VIBONE - [id number]  ALLOGRAFT BNE MOLD 10 CC VIABLE BNE MTRX VIBONE [id number] [employer] NA N/A 1 Implanted   GRAFT BONE SUBSTITUTE 5CC BIOACTIVE NANOSS - SNA  GRAFT BONE SUBSTITUTE 5CC BIOACTIVE NANOSS NA [employer]WD NA N/A 1 Implanted   GRAFT DURA 4X5 CM SUTURABLE BCNVX BOV PERICARD DURAGN - [id number]  GRAFT DURA 4X5 CM SUTURABLE BCNVX BOV PERICARD DURAGN [id number] [employer] [id number] N/A 1 Implanted   SPACER SPNL VERT BULL TIP 11 X 22 MM - S000  SPACER SPNL VERT BULL TIP 11 X 22  [employer]_WD [id number] N/A 1 Implanted   SCR SET SPNE STREAMLINE TL --  - S000  SCR SET SPNE STREAMLINE TL --  000 [employer] 000 N/A 6 Implanted   SCR BNE SPNE 6.5X50MM TI -- STREAMLINE TL - S000  SCR BNE SPNE 6.5X50MM TI -- STREAMLINE  [employer] 000 N/A 4 Implanted   SCR BNE SPNE 6.5X45MM TI -- STREAMLINE TL - S000  SCR BNE SPNE 6.5X45MM TI -- STREAMLINE  [employer] 000 N/A 2 Implanted       Drains:   Hemovac Posterior Back (Active)   Site Assessment Clean, dry, & intact 03/20/23 1417   Dressing Status Clean, dry, & intact 03/20/23 1417   Drainage Description Serosanguinous 03/20/23 1417   Status Patent; Charged 03/20/23 1417       Findings: as above    Detailed Description of Procedure: [address]  [address], [address] [address]    OPERATIVE REPORT      NAME: Naveen Jones    AGE: 74 y.o. YOB: 1955    MEDICAL RECORD NUMBER: [id number]    DATE OF SURGERY: 3/20/2023    PREOPERATIVE DIAGNOSES:  1. Lumbar stenosis  2. Acquired lumbar spondylolisthesis    POSTOPERATIVE DIAGNOSES:  1. Lumbar stenosis   2. Acquired lumbar spondylolisthesis     OPERATIVE PROCEDURES:   1. Laminectomy, partial facetectomy, and foraminotomy of L3, L4, and L5.  2. Posterolateral fusion and posterior lumbar interbody fusion of L3-L4. 3. Posterolateral fusion and posterior lumbar interbody fusion of L4-L5. 4. Pedicle screw instrumentation with RTI pedicle screws for L3, L4, and L5 bilaterally. 5. Application of biomechanical interbody device at L4-L5 and L3-L4. 6. Morselized allograft for spine surgery and local autograft for spine surgery with stem cells. SURGEON: Luz Quiles MD     ASSISTANT: RAEANN Martin    ANESTHESIA: General    Specimens - none    COMPLICATIONS: None apparent     NEUROMONITORING: We used SSEPs and spontaneous EMGs. We also stimulated the pedicle screws. ESTIMATED BLOOD LOSS: 200 cc    INDICATION FOR PROCEDURE: The patient is a very pleasant 74 y.o. female with debilitating back pain and leg pain. She failed conservative measures. She elected to proceed with operative intervention. She was aware of the risks, benefits, and alternatives. She provided informed consent. PROCEDURE: The patient was identified in the preoperative holding area. Her lumbar spine was marked by me. She was transferred to the operating room where general anesthesia was given.  She was also given perioperative antibiotics. She was placed prone on the Jose table. All bony prominences were well padded. The lumbar spine was prepped and draped in the usual standard fashion. We performed a surgical timeout. I made a skin incision in the midline. I exposed the posterior lumbar spine. I took intraoperative fluoroscopy to verify our levels. I exposed the transverse processes of L3, L4, and L5 bilaterally. I then began my decompression by performing a laminectomy of L3, L4, and L5. I also performed a partial facetectomy and foraminotomy to decompress the thecal sac and the roots of L3, L4, and L5 bilaterally. I performed a transforaminal approach on the right side with exposure of the right L4-L5 disk space by widening our partial facetectomy and foraminotomy at L4-L5. I performed an identical right transforaminal approach with exposure of the right L3-L4 disk space. I then performed a standard diskectomy at L4-L5 and L3-L4. I prepared the endplates to bleeding bone. I performed trial sizing. I placed the appropriately sized biomechanical device into L4-L5 and into L3-L4 with the appropriate amount of tension and alignment. The biomechanical devices were packed with autograft and morselized allograft. I then cannulated our pedicles for L3, L4, and L5 bilaterally in standard fashion. I probed each pedicle. I copiously irrigated the entire wound. I decorticated our fusion beds bilaterally with a high speed bur. I placed our bone graft into our fusion beds bilaterally. I then placed pedicle screws for L3, L4, and L5 bilaterally in standard fashion under fluoroscopic guidance. I had good purchase for each pedicle screw. I stimulated all the screws with pedicle screw stimulation. The pedicle screws were found to be within the appropriate range of amplitude. I then placed contoured rods on top of the pedicle screws bilaterally.  The rods were locked to the screws according to the 's specification. We had good hemostasis. There was no CSF leaking. The fusion beds were packed with morselized allograft and local autograft. The exposed neurologic elements were protected with Duragen. I injected the soft tissues with a pain management cocktail. A deep drain was placed. The wound was closed in several layers. A sterile dressing was applied. The patient was extubated and transferred to the recovery room in good medical condition. The PA assisted with retraction and wound closure    I, Dr. Jennifer Hunter, performed the above procedure.      Jennifer Hunter MD  3/20/2023        Electronically Signed by Jennifer Hunter MD on 3/20/2023 at 3:41 PM

## 2023-03-21 LAB
ANION GAP SERPL CALC-SCNC: 5 MMOL/L (ref 5–15)
BUN SERPL-MCNC: 11 MG/DL (ref 6–20)
BUN/CREAT SERPL: 15 (ref 12–20)
CALCIUM SERPL-MCNC: 8 MG/DL (ref 8.5–10.1)
CHLORIDE SERPL-SCNC: 109 MMOL/L (ref 97–108)
CO2 SERPL-SCNC: 24 MMOL/L (ref 21–32)
CREAT SERPL-MCNC: 0.75 MG/DL (ref 0.55–1.02)
GLUCOSE SERPL-MCNC: 167 MG/DL (ref 65–100)
HGB BLD-MCNC: 12.5 G/DL (ref 11.5–16)
POTASSIUM SERPL-SCNC: 4.7 MMOL/L (ref 3.5–5.1)
SODIUM SERPL-SCNC: 138 MMOL/L (ref 136–145)

## 2023-03-21 PROCEDURE — 85018 HEMOGLOBIN: CPT

## 2023-03-21 PROCEDURE — 74011000250 HC RX REV CODE- 250: Performed by: ORTHOPAEDIC SURGERY

## 2023-03-21 PROCEDURE — 74011250636 HC RX REV CODE- 250/636: Performed by: PHYSICIAN ASSISTANT

## 2023-03-21 PROCEDURE — 97116 GAIT TRAINING THERAPY: CPT

## 2023-03-21 PROCEDURE — 97530 THERAPEUTIC ACTIVITIES: CPT

## 2023-03-21 PROCEDURE — 36415 COLL VENOUS BLD VENIPUNCTURE: CPT

## 2023-03-21 PROCEDURE — 74011250637 HC RX REV CODE- 250/637: Performed by: ORTHOPAEDIC SURGERY

## 2023-03-21 PROCEDURE — 74011250636 HC RX REV CODE- 250/636: Performed by: ORTHOPAEDIC SURGERY

## 2023-03-21 PROCEDURE — 94761 N-INVAS EAR/PLS OXIMETRY MLT: CPT

## 2023-03-21 PROCEDURE — APPNB30 APP NON BILLABLE TIME 0-30 MINS: Performed by: NURSE PRACTITIONER

## 2023-03-21 PROCEDURE — 97165 OT EVAL LOW COMPLEX 30 MIN: CPT

## 2023-03-21 PROCEDURE — 65270000029 HC RM PRIVATE

## 2023-03-21 PROCEDURE — 80048 BASIC METABOLIC PNL TOTAL CA: CPT

## 2023-03-21 PROCEDURE — 97161 PT EVAL LOW COMPLEX 20 MIN: CPT

## 2023-03-21 RX ORDER — PROCHLORPERAZINE EDISYLATE 5 MG/ML
5 INJECTION INTRAMUSCULAR; INTRAVENOUS
Status: DISCONTINUED | OUTPATIENT
Start: 2023-03-21 | End: 2023-03-25 | Stop reason: HOSPADM

## 2023-03-21 RX ADMIN — POLYETHYLENE GLYCOL 3350 17 G: 17 POWDER, FOR SOLUTION ORAL at 10:31

## 2023-03-21 RX ADMIN — ESCITALOPRAM OXALATE 10 MG: 10 TABLET ORAL at 10:18

## 2023-03-21 RX ADMIN — FAMOTIDINE 20 MG: 20 TABLET, FILM COATED ORAL at 10:19

## 2023-03-21 RX ADMIN — PROCHLORPERAZINE EDISYLATE 5 MG: 5 INJECTION INTRAMUSCULAR; INTRAVENOUS at 17:15

## 2023-03-21 RX ADMIN — GABAPENTIN 300 MG: 300 CAPSULE ORAL at 10:20

## 2023-03-21 RX ADMIN — Medication 10 ML: at 22:00

## 2023-03-21 RX ADMIN — Medication 10 ML: at 17:18

## 2023-03-21 RX ADMIN — CEFAZOLIN 2 G: 1 INJECTION, POWDER, FOR SOLUTION INTRAMUSCULAR; INTRAVENOUS at 06:37

## 2023-03-21 RX ADMIN — Medication: at 08:08

## 2023-03-21 RX ADMIN — AMLODIPINE BESYLATE 5 MG: 5 TABLET ORAL at 10:31

## 2023-03-21 RX ADMIN — ONDANSETRON 4 MG: 2 INJECTION INTRAMUSCULAR; INTRAVENOUS at 06:39

## 2023-03-21 RX ADMIN — HYDROMORPHONE HYDROCHLORIDE 2 MG: 2 TABLET ORAL at 17:15

## 2023-03-21 RX ADMIN — SENNOSIDES AND DOCUSATE SODIUM 1 TABLET: 50; 8.6 TABLET ORAL at 10:17

## 2023-03-21 RX ADMIN — FAMOTIDINE 20 MG: 20 TABLET, FILM COATED ORAL at 17:15

## 2023-03-21 RX ADMIN — CHOLECALCIFEROL TAB 125 MCG (5000 UNIT) 10000 UNITS: 125 TAB at 10:21

## 2023-03-21 RX ADMIN — SODIUM CHLORIDE 125 ML/HR: 9 INJECTION, SOLUTION INTRAVENOUS at 10:29

## 2023-03-21 RX ADMIN — SENNOSIDES AND DOCUSATE SODIUM 1 TABLET: 50; 8.6 TABLET ORAL at 17:15

## 2023-03-21 RX ADMIN — GABAPENTIN 300 MG: 300 CAPSULE ORAL at 17:15

## 2023-03-21 NOTE — PROGRESS NOTES
Problem: Mobility Impaired (Adult and Pediatric)  Goal: *Acute Goals and Plan of Care (Insert Text)  Description: FUNCTIONAL STATUS PRIOR TO ADMISSION: Patient was independent and active without use of DME.    HOME SUPPORT PRIOR TO ADMISSION: The patient lived alone with no local support. Her son and DIL are to stay with her at discharge. Physical Therapy Goals  Initiated 3/21/2023    1. Patient will move from supine to sit and sit to supine  in bed with modified independence within 4 days. 2. Patient will perform sit to stand with modified independence within 4 days. 3. Patient will ambulate with modified independence for 150 feet with the least restrictive device within 4 days. 4. Patient will ascend/descend 6 stairs with 1 handrail(s) with modified independence within 4 days. 5. Patient will verbalize and demonstrate understanding of spinal precautions (No bending, lifting greater than 5 lbs, or twisting; log-roll technique; frequent repositioning as instructed) within 4 days. Outcome: Progressing Towards Goal   PHYSICAL THERAPY EVALUATION  Patient: Karie Henley (51 y.o. female)  Date: 3/21/2023  Primary Diagnosis: Lumbar stenosis [M48.061]  Procedure(s) (LRB):  L3-L5 LAMINECTOMY/FUSION/INSTRUMENTATION/TLIF/BONE GRAFT (N/A) 1 Day Post-Op   Precautions:    (no BLT, log roll technique, sitting 30-45 min, brace when up)    ASSESSMENT  Based on the objective data described below, the patient presents with generally impaired confidence in mobility, mild standing balance impairment, decreased activity tolerance, and c/o persistent nausea following admission for a L3-L5 lumbar laminectomy and fusion. Education provided regarding post-op spinal precautions and reviewed brace fit procedure with understanding verbalized by the patient. She required CGA and increased time for overall mobility. Her c/o nausea increased with activity but BP remained stable.  Gait training completed x80' without DME and noted  and slow mellisa but no LOB. The patient requests DME for discharge but needs to continued to be assessed with progress. Current Level of Function Impacting Discharge (mobility/balance): CGA for be mobility and transfers    Other factors to consider for discharge: lives alone     Patient will benefit from skilled therapy intervention to address the above noted impairments. PLAN :  Recommendations and Planned Interventions: bed mobility training, transfer training, gait training, therapeutic exercises, neuromuscular re-education, and patient and family training/education      Frequency/Duration: Patient will be followed by physical therapy:  twice daily to address goals.     Recommendation for discharge: (in order for the patient to meet his/her long term goals)  Physical therapy at least 2 days/week in the home     This discharge recommendation:  Has been made in collaboration with the attending provider and/or case management    IF patient discharges home will need the following DME: to be determined (TBD)         SUBJECTIVE:   Patient stated I don't feel as good as I t.    OBJECTIVE DATA SUMMARY:   HISTORY:    Past Medical History:   Diagnosis Date    Anxiety and depression     Hypertension     Migraines     Nausea & vomiting     PONV     Past Surgical History:   Procedure Laterality Date    HX COLONOSCOPY  04/2019    HX HYSTERECTOMY  2003    ovaries intact    HX LAP CHOLECYSTECTOMY  2008    HX WISDOM TEETH EXTRACTION         Personal factors and/or comorbidities impacting plan of care:     Home Situation  Home Environment: Private residence  # Steps to Enter: 3  Rails to Enter: Yes  Hand Rails : Bilateral (wide)  One/Two Story Residence: Two story  # of Interior Steps: 12  Interior Rails:  (right all way up, left aprox 2 steps short)  Living Alone: Yes  Support Systems: Child(niko) (son and DIL and grandchildren to stay with pt upon discharge)  Tub or Shower Type: Shower    EXAMINATION/PRESENTATION/DECISION MAKING:   Critical Behavior:  Neurologic State: Alert  Orientation Level: Oriented X4  Cognition: Appropriate decision making, Appropriate for age attention/concentration, Appropriate safety awareness, Follows commands     Hearing:     Skin:    Edema:   Range Of Motion:  AROM: Generally decreased, functional                       Strength:    Strength: Generally decreased, functional                    Tone & Sensation:                  Sensation: Intact               Coordination:     Vision:      Functional Mobility:  Bed Mobility:  Rolling: Contact guard assistance; Additional time  Supine to Sit: Contact guard assistance; Additional time     Scooting: Contact guard assistance  Transfers:  Sit to Stand: Contact guard assistance;Minimum assistance; Additional time  Stand to Sit: Contact guard assistance                       Balance:   Sitting: Intact  Standing: Impaired  Standing - Static: Good  Standing - Dynamic : Fair  Ambulation/Gait Training:  Distance (ft): 80 Feet (ft)  Assistive Device: Gait belt;Brace/Splint  Ambulation - Level of Assistance: Contact guard assistance     Gait Description (WDL): Exceptions to WDL  Gait Abnormalities: Altered arm swing;Decreased step clearance;Trunk sway increased        Base of Support: Widened     Speed/Kamilah: Pace decreased (<100 feet/min)                        Stairs: Therapeutic Exercises:       Functional Measure:  [facility] AM-PAC®      Basic Mobility Inpatient Short Form (6-Clicks) Version 2  How much HELP from another person do you currently need. .. (If the patient hasn't done an activity recently, how much help from another person do you think they would need if they tried?) Total A Lot A Little None   1. Turning from your back to your side while in a flat bed without using bedrails? []  1 []  2 [x]  3  []  4   2.   Moving from lying on your back to sitting on the side of a flat bed without using bedrails? []  1 []  2 [x]  3  []  4   3. Moving to and from a bed to a chair (including a wheelchair)? []  1 []  2 [x]  3  []  4   4. Standing up from a chair using your arms (e.g. wheelchair or bedside chair)? []  1 []  2 [x]  3  []  4   5. Walking in hospital room? []  1 []  2 [x]  3  []  4   6. Climbing 3-5 steps with a railing? []  1 []  2 [x]  3  []  4     Raw Score: 18/24                            Cutoff score ?171,2,3 had higher odds of discharging home with home health or need of SNF/IPR. [address] [facility] [place]. Validity of the AM-PAC 6-Clicks Inpatient Daily Activity and Basic Mobility Short Forms. Physical Therapy Mar 2014, 94 3) 379-391; DOI: 10.2522/ptj.21088036  2. Mercedes Quintana. Association of AM-PAC \"6-Clicks\" Basic Mobility and Daily Activity Scores With Discharge Destination. Phys Ther. 2021 Apr 4;101(4):vgwh852. doi: 10.1093/ptj/ogyb358. PMID: 75013827. V Gordon Nieves, Nohemi SPENCER, Leslie Campos, Neto K, Maldonado S. Activity Measure for Post-Acute Care \"6-Clicks\" Basic Mobility Scores Predict Discharge Destination After Acute Care Hospitalization in Select Patient Groups: A Retrospective, Observational Study. Arch Rehabil Res Clin Transl. 2022 Jul 16;4(3):453670. doi: 10.1016/j.arrct. 0720.728630. PMID: 13536743; PMCID: [id number]. 4. Kathy Bethea W, Nathalie P. AM-PAC Short Forms Manual 4.0. Revised 2/2020.          Physical Therapy Evaluation Charge Determination   History Examination Presentation Decision-Making   LOW Complexity : Zero comorbidities / personal factors that will impact the outcome / POC MEDIUM Complexity : 3 Standardized tests and measures addressing body structure, function, activity limitation and / or participation in recreation  LOW Complexity : Stable, uncomplicated  Other outcome measures [facility]  LOW       Based on the above components, the patient evaluation is determined to be of the following complexity level: LOW     Pain Ratin/10 lumbar spine    Activity Tolerance:   Fair    After treatment patient left in no apparent distress:   Supine in bed and Call bell within reach    COMMUNICATION/EDUCATION:   The patients plan of care was discussed with: Occupational therapist and Registered nurse. Fall prevention education was provided and the patient/caregiver indicated understanding., Patient/family have participated as able in goal setting and plan of care. , and Patient/family agree to work toward stated goals and plan of care.     Thank you for this referral.  Jennifer Keen, PT, DPT   Time Calculation: 27 mins

## 2023-03-21 NOTE — PROGRESS NOTES
Ortho Spine    Rounded in room with Dr. Jennifer Hunter who evaluated patient at bedside. Full progress note by RAEANN Gandhi to follow. PT/OT today. HV drain intact. Con't course of treatment and plan to discharge Thurs or Friday.      Wilder Henry, NP

## 2023-03-21 NOTE — PROGRESS NOTES
Problem: Mobility Impaired (Adult and Pediatric)  Goal: *Acute Goals and Plan of Care (Insert Text)  Description: FUNCTIONAL STATUS PRIOR TO ADMISSION: Patient was independent and active without use of DME.    HOME SUPPORT PRIOR TO ADMISSION: The patient lived alone with no local support. Her son and DIL are to stay with her at discharge. Physical Therapy Goals  Initiated 3/21/2023    1. Patient will move from supine to sit and sit to supine  in bed with modified independence within 4 days. 2. Patient will perform sit to stand with modified independence within 4 days. 3. Patient will ambulate with modified independence for 150 feet with the least restrictive device within 4 days. 4. Patient will ascend/descend 6 stairs with 1 handrail(s) with modified independence within 4 days. 5. Patient will verbalize and demonstrate understanding of spinal precautions (No bending, lifting greater than 5 lbs, or twisting; log-roll technique; frequent repositioning as instructed) within 4 days. 3/21/2023 1448 by Jerri Story PT, DPT  Outcome: Progressing Towards Goal  3/21/2023 1206 by Jerri Story PT, DPT  Outcome: Progressing Towards Goal   PHYSICAL THERAPY TREATMENT  Patient: Jacqueline Garcia (62 y.o. female)  Date: 3/21/2023  Diagnosis: Lumbar stenosis [M48.061] <principal problem not specified>  Procedure(s) (LRB):  L3-L5 LAMINECTOMY/FUSION/INSTRUMENTATION/TLIF/BONE GRAFT (N/A) 1 Day Post-Op  Precautions:  (no BLT, log roll technique, sitting 30-45 min, brace when up)  Chart, physical therapy assessment, plan of care and goals were reviewed. ASSESSMENT  Patient continues with skilled PT services and is progressing towards goals. Patient reports improvement in nausea from this am and agreeable to mobilize. She verbalized good recall of post-op back precautions but required min cues to avoid twisting during bed mobility. She required CGA-minimal assist overall and increased time.  Gait training completed x150' without DME requiring CGA with a slow mellisa and wide CARY. Progressing appropriately towards goals but may benefit from a RW trial if her mellisa and confidence don't improve next session. She will benefit from HHPT follow up at discharge. Current Level of Function Impacting Discharge (mobility/balance): CGA-min for transfers, min cues to avoid twisting    Other factors to consider for discharge: lives alone         PLAN :  Patient continues to benefit from skilled intervention to address the above impairments. Continue treatment per established plan of care. to address goals. Recommendation for discharge: (in order for the patient to meet his/her long term goals)  Physical therapy at least 2 days/week in the home     This discharge recommendation:  Has not yet been discussed the attending provider and/or case management    IF patient discharges home will need the following DME: to be determined (TBD)       SUBJECTIVE:   Patient stated I feel a little better than this morning.     OBJECTIVE DATA SUMMARY:   Critical Behavior:  Neurologic State: Alert  Orientation Level: Oriented X4  Cognition: Appropriate decision making, Appropriate safety awareness     Functional Mobility Training:  Bed Mobility:  Rolling: Contact guard assistance; Additional time  Supine to Sit: Contact guard assistance; Additional time     Scooting: Contact guard assistance        Transfers:  Sit to Stand: Contact guard assistance;Minimum assistance; Additional time  Stand to Sit: Contact guard assistance                             Balance:  Sitting: Intact  Standing: Impaired  Standing - Static: Good  Standing - Dynamic : Fair  Ambulation/Gait Training:  Distance (ft): 150 Feet (ft)  Assistive Device: Gait belt;Brace/Splint  Ambulation - Level of Assistance: Contact guard assistance     Gait Description (WDL): Exceptions to WDL  Gait Abnormalities: Altered arm swing;Decreased step clearance;Trunk sway increased Base of Support: Widened     Speed/Kamilah: Pace decreased (<100 feet/min)              Therapeutic Exercises:     Pain Ratin/10 lumbar spine    Activity Tolerance:   Good    After treatment patient left in no apparent distress:   Supine in bed and Call bell within reach    COMMUNICATION/COLLABORATION:   The patients plan of care was discussed with: Registered nurse.      Ingrid Hodgkin, PT, DPT   Time Calculation: 24 mins

## 2023-03-21 NOTE — PROGRESS NOTES
Ortho Spine F/u      Pain controlled. Medically stable. Nausea improved after IV compazine. Progressing with PT. HV drain intact, draining serosanguineous drainage. Discussed plan of care and answered all questions. Tentative plan - d/c home on Thursday.       Mark Mullen NP

## 2023-03-21 NOTE — PROGRESS NOTES
Problem: Self Care Deficits Care Plan (Adult)  Goal: *Acute Goals and Plan of Care (Insert Text)  Description: FUNCTIONAL STATUS PRIOR TO ADMISSION: Patient was independent and active without use of DME.     HOME SUPPORT: The patient lived alone with son and his family to provide assistance as needed (will be staying with pt upon discharge). Occupational Therapy Goals  Initiated 3/21/2023  1. Patient will perform grooming with modified independence within 7 day(s). 2.  Patient will perform upper body dressing with modified independence within 7 day(s). 3.  Patient will perform lower body dressing with modified independence within 7 day(s). 4.  Patient will perform toilet transfers with modified independence within 7 day(s). 5.  Patient will perform all aspects of toileting with modified independence within 7 day(s). Outcome: Not Met     OCCUPATIONAL THERAPY EVALUATION  Patient: Mei Gilliland (63 y.o. female)  Date: 3/21/2023  Primary Diagnosis: Lumbar stenosis [M48.061]  Procedure(s) (LRB):  L3-L5 LAMINECTOMY/FUSION/INSTRUMENTATION/TLIF/BONE GRAFT (N/A) 1 Day Post-Op   Precautions:   (no BLT, log roll technique, sitting 30-45 min, brace when up)    ASSESSMENT  Patient received semi supine in bed A&OX4 and agreeable for OT/PT eval/tx as patient is s/p L3-5 laminectomy, fusion, instrumentation, TLIF, bone graft (3/20/2023 by Dr. Alfred Florence with brace in room for OOB). Per pt report, pt lives alone in two story home with 3 steps wide michelle rails to enter and aprox 12 steps right rail to second level (left rail half way up) and is independent for self care and functional transfers/mobility at baseline. Patient stating son and his family will be staying with patient upon discharge. Patient educated on back precautions of no bending/lifting/twisting with pt verbalizing understanding. Patient educated on LB dressing techniques with pt verbalizing understanding.  Patient presents with decreased balance, decreased activity tolerance (noted with soft BP see vitals flow sheet for details) and increased need for assist with self care (SBA LB Dressing simulated crossing legs, SBA jerzy/doff back brace, SBA grooming simulated EOB) and functional transfers/mobility (CGA rolling, CGA sup->sit and scooting EOB, CGA/min A sit<->stand and toilet transfer simulated with gait belt). Patient would benefit from continued skilled OT services while at [facility] in order to increase safety and independence with self care and functional transfers/mobility. D/C recommendation TBD HHOT vs home with family care pending progress with therapy next session. Functional Outcome Measure: The patient scored 20/24 on the [address] outcome measure   Other factors to consider for discharge: time since onset, severity of deficits, PLOF        PLAN :  Recommendations and Planned Interventions: self care training, functional mobility training, therapeutic exercise, balance training, therapeutic activities, endurance activities, patient education, and home safety training    Frequency/Duration: Patient will be followed by occupational therapy 5 times a week to address goals. Recommendation for discharge: (in order for the patient to meet his/her long term goals)  TBD HHOT vs home with family care pending progress next session    This discharge recommendation:  Has been made in collaboration with the attending provider and/or case management    IF patient discharges home will need the following DME: TBD at this time no needs       SUBJECTIVE:   Patient stated I need a commode, a shower chair, a walker, a cane.     OBJECTIVE DATA SUMMARY:   HISTORY:   Past Medical History:   Diagnosis Date    Anxiety and depression     Hypertension     Migraines     Nausea & vomiting     PONV     Past Surgical History:   Procedure Laterality Date    HX COLONOSCOPY  04/2019    HX HYSTERECTOMY  2003    ovaries intact    HX LAP CHOLECYSTECTOMY  2008    HX WISDOM TEETH EXTRACTION         Expanded or extensive additional review of patient history:     Home Situation  Home Environment: Private residence  # Steps to Enter: 3  Rails to Enter: Yes  Hand Rails : Bilateral (wide)  One/Two Story Residence: Two story  # of Interior Steps: Emmy 87:  (right all way up, left aprox 2 steps short)  Living Alone: Yes  Support Systems: Child(niko) (son and DIL and grandchildren to stay with pt upon discharge)  Tub or Shower Type: Shower    EXAMINATION OF PERFORMANCE DEFICITS:  Cognitive/Behavioral Status:  Neurologic State: Alert  Orientation Level: Oriented X4  Cognition: Appropriate decision making; Appropriate safety awareness     Range of Motion:  AROM: Generally decreased, functional     Strength:  Strength: Generally decreased, functional     Tone & Sensation:  Sensation: Intact     Balance:  Sitting: Intact  Standing: Impaired  Standing - Static: Good  Standing - Dynamic : Fair    Functional Mobility and Transfers for ADLs:  Bed Mobility:  Rolling: Contact guard assistance; Additional time  Supine to Sit: Contact guard assistance; Additional time  Scooting: Contact guard assistance    Transfers:  Sit to Stand: Contact guard assistance;Minimum assistance; Additional time  Stand to Sit: Contact guard assistance  Toilet Transfer : Contact guard assistance;Minimum assistance; Additional time (simulated)  Assistive Device : Gait Belt;Walker, rolling    ADL Assessment:     Oral Facial Hygiene/Grooming: Stand-by assistance (simulated)    Upper Body Dressing: Stand-by assistance (jerzy/doff back brace)    Lower Body Dressing: Stand-by assistance (simulated socks)    ADL Intervention and task modifications:     Grooming  Grooming Assistance: Stand-by assistance (simulated)  Position Performed: Seated edge of bed  Washing Face: Stand-by assistance  Washing Hands: Stand-by assistance    Upper Body Dressing Assistance  Orthotics(Brace): Stand-by assistance    Lower Body Dressing Assistance  Socks: Stand-by assistance (simulated)  Leg Crossed Method Used: Yes  Position Performed: Seated edge of bed       Functional Measure:  [address] [facility] [address] [address] AM-PAC®      Daily Activity Inpatient Short Form (6-Clicks) Version 2  How much HELP from another person do you currently need. .. (If the patient hasn't done an activity recently, how much help from another person do you think they would need if they tried?) Total A Lot A Little None   1. Putting on and taking off regular lower body clothing? []   1 []   2 [x]   3 []   4   2. Bathing (including washing, rinsing, drying)? []   1 []   2 [x]   3 []   4   3. Toileting, which includes using toilet, bedpan, or urinal? []   1 []   2 [x]   3 []   4   4. Putting on and taking off regular upper body clothing? []   1 []   2 []   3 [x]   4   5. Taking care of personal grooming such as brushing teeth? []   1 []   2 [x]   3 []   4   6. Eating meals? []   1 []   2 []   3 [x]   4     Raw Score: 20/24                            Cutoff score ?191,2,3 had higher odds of discharging home with home health or need of SNF/IPR    1. Wolfgang Jain. Validity of the AM-PAC 6-Clicks Inpatient Daily Activity and Basic Mobility Short Forms. Physical Therapy Mar 2014, 94 (3) 379-391; DOI: 10.2522/ptj.[id number]  2. Jihan Cheahtam. Association of AM-PAC \"6-Clicks\" Basic Mobility and Daily Activity Scores With Discharge Destination. Phys Ther. 2021 Apr 4;101(4):dmbv617. doi: 10.1093/ptj/oozj064. PMID: 84544568. AUSTYN Nieves, Nohemi SPENCER, Darius Godinez, Neto KWOK, Maldonado S. Activity Measure for Post-Acute Care \"6-Clicks\" Basic Mobility Scores Predict Discharge Destination After Acute Care Hospitalization in Select Patient Groups: A Retrospective, Observational Study. Arch Rehabil Res Clin Transl. 2022 Jul 16;4(3):104044. doi: 10.1016/j.arrct. 7537.792695.  PMID: 46144129; PMCID: [id number]. 4. Kathy Ramires WNathalie. AM-PAC Short Forms Manual 4.0. Revised 2/2020. Occupational Therapy Evaluation Charge Determination   History Examination Decision-Making   LOW Complexity : Brief history review  LOW Complexity : 1-3 performance deficits relating to physical, cognitive , or psychosocial skils that result in activity limitations and / or participation restrictions  LOW Complexity : No comorbidities that affect functional and no verbal or physical assistance needed to complete eval tasks       Based on the above components, the patient evaluation is determined to be of the following complexity level: LOW   Pain Rating:  Pt stating some back pain however nauseous throughout    Activity Tolerance:   Good and requires rest breaks    After treatment patient left in no apparent distress:    Supine in bed, Call bell within reach, and Bed / chair alarm activated    COMMUNICATION/EDUCATION:   The patients plan of care was discussed with: Physical therapist and Registered nurse. Co-treatment completed with PT for increased patient and clinician safety    Home safety education was provided and the patient/caregiver indicated understanding. and Patient/family have participated as able in goal setting and plan of care. This patients plan of care is appropriate for delegation to [facility].     Thank you for this referral.  Dwayne Roche  Time Calculation: 41 mins

## 2023-03-21 NOTE — PROGRESS NOTES
Problem: Falls - Risk of  Goal: *Absence of Falls  Description: Document Eudelia Romberg Fall Risk and appropriate interventions in the flowsheet.   Outcome: Progressing Towards Goal  Note: Fall Risk Interventions:                                Problem: Patient Education: Go to Patient Education Activity  Goal: Patient/Family Education  Outcome: Progressing Towards Goal     Problem: Patient Education: Go to Patient Education Activity  Goal: Patient/Family Education  Outcome: Progressing Towards Goal     Problem: Patient Education: Go to Patient Education Activity  Goal: Patient/Family Education  Outcome: Progressing Towards Goal     Problem: Pain  Goal: *Control of Pain  Outcome: Progressing Towards Goal  Goal: *PALLIATIVE CARE:  Alleviation of Pain  Outcome: Progressing Towards Goal     Problem: Patient Education: Go to Patient Education Activity  Goal: Patient/Family Education  Outcome: Progressing Towards Goal     Problem: Simple Spine Procedure:  Day of Surgery  Goal: Off Pathway (Use only if patient is Off Pathway)  Outcome: Progressing Towards Goal  Goal: Activity/Safety  Outcome: Progressing Towards Goal  Goal: Consults, if ordered  Outcome: Progressing Towards Goal  Goal: Nutrition/Diet  Outcome: Progressing Towards Goal  Goal: Discharge Planning  Outcome: Progressing Towards Goal  Goal: Medications  Outcome: Progressing Towards Goal  Goal: Respiratory  Outcome: Progressing Towards Goal  Goal: Treatments/Interventions/Procedures  Outcome: Progressing Towards Goal  Goal: Psychosocial  Outcome: Progressing Towards Goal  Goal: *Verbalizes understanding of type and use of pain medication  Outcome: Progressing Towards Goal  Goal: *Optimal pain control at patient's stated goal  Outcome: Progressing Towards Goal  Goal: *Verbalizes/demonstrates understanding of proper body mechanics and use of stabilization device if ordered  Outcome: Progressing Towards Goal  Goal: *Activity level attained as ordered  Outcome: Progressing Towards Goal  Goal: *Active bowel sounds  Outcome: Progressing Towards Goal  Goal: *Respiratory status stable  Outcome: Progressing Towards Goal  Goal: *Adequate urinary output  Outcome: Progressing Towards Goal  Goal: *Hemodynamically stable  Outcome: Progressing Towards Goal     Problem: Simple Spine Procedure:  Post Op Day 1/Day of Discharge  Goal: Off Pathway (Use only if patient is Off Pathway)  Outcome: Progressing Towards Goal  Goal: Activity/Safety  Outcome: Progressing Towards Goal  Goal: Nutrition/Diet  Outcome: Progressing Towards Goal  Goal: Discharge Planning  Outcome: Progressing Towards Goal  Goal: Medications  Outcome: Progressing Towards Goal  Goal: Respiratory  Outcome: Progressing Towards Goal  Goal: Treatments/Interventions/Procedures  Outcome: Progressing Towards Goal  Goal: Psychosocial  Outcome: Progressing Towards Goal     Problem: Simple Spine Procedure: Discharge Outcomes  Goal: *Optimal pain control at patient's stated goal  Outcome: Progressing Towards Goal  Goal: *Demonstrates ability to place and remove stabilization device  Outcome: Progressing Towards Goal  Goal: *Progress independence mobility/activities (eg: Mobility precautions)  Outcome: Progressing Towards Goal  Goal: *Resumes normal function of bladder and bowel  Outcome: Progressing Towards Goal  Goal: *Lungs clear or at baseline  Outcome: Progressing Towards Goal  Goal: *Verbalizes name, dosage, time, side effects, and number of days to continue medications  Outcome: Progressing Towards Goal  Goal: *Modified independence with transfers, ambulation on levels with assistance devices, stair climbing, ADL's  Outcome: Progressing Towards Goal  Goal: *Describes follow-up/return visits to physicians  Outcome: Progressing Towards Goal  Goal: *Describes available resources and support systems  Outcome: Progressing Towards Goal  Goal: *Labs within defined limits  Outcome: Progressing Towards Goal  Goal: *Tolerating diet  Outcome: Progressing Towards Goal

## 2023-03-21 NOTE — PROGRESS NOTES
ORTHOPAEDIC LUMBAR FUSION PROGRESS NOTE    NAME:     Soham Mayberry   :       1955   MRN:       [id number]   DATE:      3/21/2023    POD:    1 Day Post-Op  S/P:    Procedure(s):  L3-L5 LAMINECTOMY/FUSION/INSTRUMENTATION/TLIF/BONE GRAFT    SUBJECTIVE:    C/O back pain along surgical incision  Having \"a little nausea\" (eating lunch)  No leg pain or numbness  Denies nausea/vomiting, headache, chest pain or shortness of breath  Pain controlled    Recent Labs     23  0247   HGB 12.5      K 4.7   *   CO2 24   BUN 11   CREA 0.75   *     Patient Vitals for the past 12 hrs:   BP Temp Pulse Resp SpO2   23 0400 -- -- -- -- 93 %   23 0300 109/76 -- 81 -- 93 %   23 0200 96/62 98.4 °F (36.9 °C) 80 14 96 %   23 0100 -- -- -- -- 93 %   23 0000 -- -- -- -- 94 %   23 2300 -- -- -- -- 94 %   23 2230 105/77 -- 77 16 95 %   23 2200 106/77 97.4 °F (36.3 °C) 74 12 95 %   23 2100 -- -- -- -- 96 %       EXAM:  Dressings clean, dry and intact   Positive strength/ROM bilat lower ext.   Neuro intact to sensation  Calves, soft & nontender  BL LEs NVID      PLAN:  D/C PCA, change to prn PO pain medications  Monitor hemovac drain output  Maintain SCDs  PT/OT, OOB w/ assist  Advance diet as tolerated  Stop prn zofran, changed to prn compazine      Clarkenafisa Carl, [address]  Orthopaedic Surgery  Physician Assistant to Dr. Rayo Gomez

## 2023-03-21 NOTE — PROGRESS NOTES
SBAR report received from John Bryant, [address] [place] [address].  PT. Resting quietly, ate small amount of breakfast due to mild nausea. Encouraged pca and Incentive spirometer. Call bell in reach.   P.T. will come to see and evaluate pt this am.

## 2023-03-22 ENCOUNTER — APPOINTMENT (OUTPATIENT)
Dept: GENERAL RADIOLOGY | Age: 68
DRG: 453 | End: 2023-03-22
Attending: NURSE PRACTITIONER
Payer: COMMERCIAL

## 2023-03-22 LAB
ANION GAP SERPL CALC-SCNC: 4 MMOL/L (ref 5–15)
BUN SERPL-MCNC: 10 MG/DL (ref 6–20)
BUN/CREAT SERPL: 17 (ref 12–20)
CALCIUM SERPL-MCNC: 8.4 MG/DL (ref 8.5–10.1)
CHLORIDE SERPL-SCNC: 111 MMOL/L (ref 97–108)
CO2 SERPL-SCNC: 27 MMOL/L (ref 21–32)
CREAT SERPL-MCNC: 0.58 MG/DL (ref 0.55–1.02)
GLUCOSE SERPL-MCNC: 115 MG/DL (ref 65–100)
HGB BLD-MCNC: 11 G/DL (ref 11.5–16)
POTASSIUM SERPL-SCNC: 4 MMOL/L (ref 3.5–5.1)
SODIUM SERPL-SCNC: 142 MMOL/L (ref 136–145)

## 2023-03-22 PROCEDURE — 36415 COLL VENOUS BLD VENIPUNCTURE: CPT

## 2023-03-22 PROCEDURE — 97535 SELF CARE MNGMENT TRAINING: CPT

## 2023-03-22 PROCEDURE — APPNB30 APP NON BILLABLE TIME 0-30 MINS: Performed by: NURSE PRACTITIONER

## 2023-03-22 PROCEDURE — 74011000250 HC RX REV CODE- 250: Performed by: ORTHOPAEDIC SURGERY

## 2023-03-22 PROCEDURE — 65270000029 HC RM PRIVATE

## 2023-03-22 PROCEDURE — 77010033678 HC OXYGEN DAILY

## 2023-03-22 PROCEDURE — 71045 X-RAY EXAM CHEST 1 VIEW: CPT

## 2023-03-22 PROCEDURE — 74011250636 HC RX REV CODE- 250/636: Performed by: PHYSICIAN ASSISTANT

## 2023-03-22 PROCEDURE — 74011250637 HC RX REV CODE- 250/637: Performed by: ORTHOPAEDIC SURGERY

## 2023-03-22 PROCEDURE — 80048 BASIC METABOLIC PNL TOTAL CA: CPT

## 2023-03-22 PROCEDURE — 97116 GAIT TRAINING THERAPY: CPT

## 2023-03-22 PROCEDURE — 94761 N-INVAS EAR/PLS OXIMETRY MLT: CPT

## 2023-03-22 PROCEDURE — 97530 THERAPEUTIC ACTIVITIES: CPT

## 2023-03-22 PROCEDURE — 85018 HEMOGLOBIN: CPT

## 2023-03-22 RX ADMIN — Medication 10 ML: at 22:06

## 2023-03-22 RX ADMIN — SENNOSIDES AND DOCUSATE SODIUM 1 TABLET: 50; 8.6 TABLET ORAL at 18:33

## 2023-03-22 RX ADMIN — ACETAMINOPHEN 650 MG: 325 TABLET ORAL at 00:51

## 2023-03-22 RX ADMIN — Medication 10 ML: at 13:44

## 2023-03-22 RX ADMIN — SENNOSIDES AND DOCUSATE SODIUM 1 TABLET: 50; 8.6 TABLET ORAL at 08:55

## 2023-03-22 RX ADMIN — AMLODIPINE BESYLATE 5 MG: 5 TABLET ORAL at 08:55

## 2023-03-22 RX ADMIN — FAMOTIDINE 20 MG: 20 TABLET, FILM COATED ORAL at 08:55

## 2023-03-22 RX ADMIN — HYDROMORPHONE HYDROCHLORIDE 4 MG: 2 TABLET ORAL at 20:08

## 2023-03-22 RX ADMIN — HYDROMORPHONE HYDROCHLORIDE 2 MG: 2 TABLET ORAL at 13:43

## 2023-03-22 RX ADMIN — HYDROMORPHONE HYDROCHLORIDE 2 MG: 2 TABLET ORAL at 04:53

## 2023-03-22 RX ADMIN — Medication 10 ML: at 05:10

## 2023-03-22 RX ADMIN — POLYETHYLENE GLYCOL 3350 17 G: 17 POWDER, FOR SOLUTION ORAL at 09:08

## 2023-03-22 RX ADMIN — CHOLECALCIFEROL TAB 125 MCG (5000 UNIT) 10000 UNITS: 125 TAB at 08:55

## 2023-03-22 RX ADMIN — PROCHLORPERAZINE EDISYLATE 5 MG: 5 INJECTION INTRAMUSCULAR; INTRAVENOUS at 05:03

## 2023-03-22 RX ADMIN — GABAPENTIN 300 MG: 300 CAPSULE ORAL at 08:55

## 2023-03-22 RX ADMIN — ESCITALOPRAM OXALATE 10 MG: 10 TABLET ORAL at 08:55

## 2023-03-22 RX ADMIN — GABAPENTIN 300 MG: 300 CAPSULE ORAL at 18:33

## 2023-03-22 RX ADMIN — FAMOTIDINE 20 MG: 20 TABLET, FILM COATED ORAL at 18:33

## 2023-03-22 NOTE — PROGRESS NOTES
ORTHOPAEDIC LUMBAR FUSION PROGRESS NOTE    NAME:     Marcela Alfred   :       1955   MRN:       [id number]   DATE:      3/22/2023    POD:    2 Days Post-Op  S/P:    Procedure(s):  L3-L5 LAMINECTOMY/FUSION/INSTRUMENTATION/TLIF/BONE GRAFT    SUBJECTIVE:    C/O back pain along surgical incision  No leg pain or numbness  Denies nausea/vomiting, headache, chest pain or shortness of breath  Pain controlled    Recent Labs     23  0345   HGB 11.0*      K 4.0   *   CO2 27   BUN 10   CREA 0.58   *     Patient Vitals for the past 12 hrs:   BP Temp Pulse Resp SpO2   23 1021 108/69 97.8 °F (36.6 °C) 83 18 (!) 89 %   23 0728 110/69 98.7 °F (37.1 °C) 84 18 93 %   23 0446 126/65 98.6 °F (37 °C) 86 18 91 %       EXAM:  Dressings clean, dry and intact   Positive strength/ROM bilat lower ext.   Neuro intact to sensation  Calves, soft & nontender  BL LEs NVID      PLAN:  Continue prn PO pain medications  Monitor hemovac drain output  Maintain SCDs  PT/OT, OOB w/ assist  Tolerating diet      Romelia Pnea, [address]  Orthopaedic Surgery  Physician Assistant to Dr. Bebe Bowling

## 2023-03-22 NOTE — PROGRESS NOTES
Problem: Falls - Risk of  Goal: *Absence of Falls  Description: Document Saba Colon Fall Risk and appropriate interventions in the flowsheet.   Outcome: Progressing Towards Goal  Note: Fall Risk Interventions:                                Problem: Patient Education: Go to Patient Education Activity  Goal: Patient/Family Education  Outcome: Progressing Towards Goal     Problem: Patient Education: Go to Patient Education Activity  Goal: Patient/Family Education  Outcome: Progressing Towards Goal     Problem: Patient Education: Go to Patient Education Activity  Goal: Patient/Family Education  Outcome: Progressing Towards Goal     Problem: Pain  Goal: *Control of Pain  Outcome: Progressing Towards Goal  Goal: *PALLIATIVE CARE:  Alleviation of Pain  Outcome: Progressing Towards Goal     Problem: Patient Education: Go to Patient Education Activity  Goal: Patient/Family Education  Outcome: Progressing Towards Goal     Problem: Simple Spine Procedure:  Day of Surgery  Goal: Off Pathway (Use only if patient is Off Pathway)  Outcome: Progressing Towards Goal  Goal: Activity/Safety  Outcome: Progressing Towards Goal  Goal: Consults, if ordered  Outcome: Progressing Towards Goal  Goal: Nutrition/Diet  Outcome: Progressing Towards Goal  Goal: Discharge Planning  Outcome: Progressing Towards Goal  Goal: Medications  Outcome: Progressing Towards Goal  Goal: Respiratory  Outcome: Progressing Towards Goal  Goal: Treatments/Interventions/Procedures  Outcome: Progressing Towards Goal  Goal: Psychosocial  Outcome: Progressing Towards Goal  Goal: *Verbalizes understanding of type and use of pain medication  Outcome: Progressing Towards Goal  Goal: *Optimal pain control at patient's stated goal  Outcome: Progressing Towards Goal  Goal: *Verbalizes/demonstrates understanding of proper body mechanics and use of stabilization device if ordered  Outcome: Progressing Towards Goal  Goal: *Activity level attained as ordered  Outcome: Progressing Towards Goal  Goal: *Active bowel sounds  Outcome: Progressing Towards Goal  Goal: *Respiratory status stable  Outcome: Progressing Towards Goal  Goal: *Adequate urinary output  Outcome: Progressing Towards Goal  Goal: *Hemodynamically stable  Outcome: Progressing Towards Goal     Problem: Simple Spine Procedure:  Post Op Day 1/Day of Discharge  Goal: Off Pathway (Use only if patient is Off Pathway)  Outcome: Progressing Towards Goal  Goal: Activity/Safety  Outcome: Progressing Towards Goal  Goal: Nutrition/Diet  Outcome: Progressing Towards Goal  Goal: Discharge Planning  Outcome: Progressing Towards Goal  Goal: Medications  Outcome: Progressing Towards Goal  Goal: Respiratory  Outcome: Progressing Towards Goal  Goal: Treatments/Interventions/Procedures  Outcome: Progressing Towards Goal  Goal: Psychosocial  Outcome: Progressing Towards Goal     Problem: Simple Spine Procedure: Discharge Outcomes  Goal: *Optimal pain control at patient's stated goal  Outcome: Progressing Towards Goal  Goal: *Demonstrates ability to place and remove stabilization device  Outcome: Progressing Towards Goal  Goal: *Progress independence mobility/activities (eg: Mobility precautions)  Outcome: Progressing Towards Goal  Goal: *Resumes normal function of bladder and bowel  Outcome: Progressing Towards Goal  Goal: *Lungs clear or at baseline  Outcome: Progressing Towards Goal  Goal: *Verbalizes name, dosage, time, side effects, and number of days to continue medications  Outcome: Progressing Towards Goal  Goal: *Modified independence with transfers, ambulation on levels with assistance devices, stair climbing, ADL's  Outcome: Progressing Towards Goal  Goal: *Describes follow-up/return visits to physicians  Outcome: Progressing Towards Goal  Goal: *Describes available resources and support systems  Outcome: Progressing Towards Goal  Goal: *Labs within defined limits  Outcome: Progressing Towards Goal  Goal: *Tolerating diet  Outcome: Progressing Towards Goal

## 2023-03-22 NOTE — PROGRESS NOTES
3/22/2023    3:00 PM  Orders and clinicals faxed to [employer] , [address], at 1 [phone]. Cm received fax confirmation. 2:47 PM  Care Management Assessment      Reason for Admission: Elective - Surgery required    Lumbar stenosis [M48.061]  Procedure(s) (LRB):  L3-L5 LAMINECTOMY/FUSION/INSTRUMENTATION/TLIF/BONE GRAFT (N/A)  2 Days Post-Op      Assessment:   [x]In person with pt   []Via p/c with pt   []With family member in person. Who/Relation:     []With family member via p/c. Who/Relation:   []Chart Review    RUR: 3%  Risk Level: [x]Low []Moderate []High  Value-based purchasing: [] Yes [x] No    Advance Directive: No Order. [x] No AD on file. [] AD on file. [] Current AD not on file. Copy requested. [] Requests AD, and referral submitted to [facility]. Healthcare Decision Maker: Children        Assessment:    Age: 74 y.o. Sex: [] Male [x]Female     Residency: [x]Private residence []Apartment []Assisted Living []LTC []Other:   Exterior Steps: 3  Interior Steps: 1 flight    Lives With: []With spouse []Other family members []Underage children [x]Alone []Care provider [x]Other: Pt's son, DIL, and sister will assist pt during recovery. Prior functioning:  [x]Independent with ADLs and iADLS []Dependent with ADLs and iADLs []Partial dependence, Specify:     Cognition: [x]Intact []Some spheres some of the time. []Some spheres all of the time. []All spheres all of the time.      Prior transportation method: [x]Self []Spouse [x]Other family members []Medicaid transport []Other:     Prior DME required:  [x]None []RW []Cane []Crutches []Bedside commode []CPAP []Home O2 (Liter/Provider: ) []Nebulizer   []Shower Chair []Wheelchair []Hospital Bed []Valentine []Stair lift []Rollator []Other:    DME available: [x]None []RW []Cane []Crutches []Bedside commode []CPAP []Home O2 (Liter/Provider: ) []Nebulizer   []Shower Chair []Wheelchair []Hospital Bed []Valentine []Stair lift []Rollator [x]Other: CM consult for RW, BSC, and hip kit noted. DME orders emailed via [facility] to Celsion , Valdez Bolton (P:1 [phone]), at [email or web address] Mercedes aware of potential DC tomorrow, and will keep CM informed of DME order progress. Rehab history: []None [x]Outpatient PT []Home Health (Provider/Date: ) []SNF (Provider/Date: ) []IPR (Provider/Date: ) []LTC (Provider/Date: ) []Hospice (Provider/Date: )  []Other:     Covid vaccination status:   [] Yes, Type:  [] Booster 1 [] Booster 2  [] No  [x] N/A / Not asked    Insurer:   Insurance Information                  ONE CALL [address] Phone: [phone]    Subscriber: Kelsie Guidry Subscriber#: [id number]    Group#: -- Precert#: --        Workers' Comp Claim # [id number]. Workers' Comp  # [phone]      PCP: Anjana Callahan or Elena Damon NP   Address: [address] [place] [address] / [place] [address]   Phone number: [phone]   Current patient: [x]Yes []No   Approximate date of last visit: 02/28/2023   Access to virtual PCP visits: []Yes [x]No     Financial concerns/barriers: []Yes, explain: [x]No []Unknown/Not discussed    Pharmacy: [facility] [address] [address] Transport: Family     Discharge Concerns: []Yes [x]No []Unknown   Describe:    Comments:           Transition of care plan:    []Unable to determine at this time. Awaiting clinical progress, and disposition recommendations. [] Home with family assistance as needed, and outpatient follow-up. [] Home with Outpatient PT and outpatient follow-up   Pt aware of OP appt? []Yes, Provider:   []Not scheduled   Transport provider:     [x] Home with Home Health   - Provider: [facility] orders emailed via [facility] to Celsion Martell, at [email or web address] Mercedes aware of potential DC tomorrow, and will keep CM informed of [facility] order progress.      []SNF/IPR   -[]Preferences given:   []Listing provided and preferences requested   -Status: []Pending []Accepted:    -Auth required: []Yes []No    -Auth initiated date:   -3 midnight stay required: []Yes []No  Date satisfied:     [] LTC:     [] Home with Hospice   -Provider:     [] [facility] information provided. [] Other:     Barbara Wetzel MA    Care Management Interventions  PCP Verified by CM: Yes  Mode of Transport at Discharge:  Other (see comment)  Transition of Care Consult (CM Consult): Discharge Planning  MyChart Signup: No  Discharge Durable Medical Equipment: Yes  Physical Therapy Consult: Yes  Occupational Therapy Consult: Yes  Speech Therapy Consult: No  Support Systems: Child(niko), Other Family Member(s)  Confirm Follow Up Transport: Family  Discharge Location  Patient Expects to be Discharged to[de-identified] Home with home health

## 2023-03-22 NOTE — PROGRESS NOTES
ORTHOPAEDIC LUMBAR FUSION PROGRESS NOTE    NAME:     Soham Mayberry   :       1955   MRN:       [id number]   DATE:      3/22/2023    POD:    2 Days Post-Op  S/P:    Procedure(s):  L3-L5 LAMINECTOMY/FUSION/INSTRUMENTATION/TLIF/BONE GRAFT    SUBJECTIVE:    C/O back pain along surgical incision  Denies nausea/vomiting, headache   Denies dyspnea or chest pain - at rest or with activity  Denies body aches, chills  Pain controlled    Recent Labs     23  0345   HGB 11.0*      K 4.0   *   CO2 27   BUN 10   CREA 0.58   *     Patient Vitals for the past 12 hrs:   BP Temp Pulse Resp SpO2   23 1021 108/69 97.8 °F (36.6 °C) 83 18 (!) 89 %   23 0728 110/69 98.7 °F (37.1 °C) 84 18 93 %   23 0446 126/65 98.6 °F (37 °C) 86 18 91 %       EXAM:  Gen - calm, alert, in NAD  Heart - RRR  Lungs - Unlabored respirations. Diminished air movement without adventitious sounds  Dressings clean, dry and intact     PLAN:  PO pain medications prn  PT/OT  Noted drop in SpO2 with activity during therapy sessions  Stopped smoking on 3/9/23  Educated patient on proper IS use. Pt only able to elevate to 750mL. CXR and encouraged frequent IS    Discussed above with Dr. Manuela Dubon / RAEANN Newton who agree with plan.       Rohit Orosco NP

## 2023-03-22 NOTE — PROGRESS NOTES
Problem: Mobility Impaired (Adult and Pediatric)  Goal: *Acute Goals and Plan of Care (Insert Text)  Description: FUNCTIONAL STATUS PRIOR TO ADMISSION: Patient was independent and active without use of DME.    HOME SUPPORT PRIOR TO ADMISSION: The patient lived alone with no local support. Her son and DIL are to stay with her at discharge. Physical Therapy Goals  Initiated 3/21/2023    1. Patient will move from supine to sit and sit to supine  in bed with modified independence within 4 days. 2. Patient will perform sit to stand with modified independence within 4 days. 3. Patient will ambulate with modified independence for 150 feet with the least restrictive device within 4 days. 4. Patient will ascend/descend 6 stairs with 1 handrail(s) with modified independence within 4 days. 5. Patient will verbalize and demonstrate understanding of spinal precautions (No bending, lifting greater than 5 lbs, or twisting; log-roll technique; frequent repositioning as instructed) within 4 days. 3/22/2023 1346 by Sheila Hollingsworth, PT  Outcome: Progressing Towards Goal  3/22/2023 1140 by Sheila Hollingsworth, PT  Outcome: Progressing Towards Goal   PHYSICAL THERAPY TREATMENT  Patient: Giacomo Herman (18 y.o. female)  Date: 3/22/2023  Diagnosis: Lumbar stenosis [M48.061] <principal problem not specified>  Procedure(s) (LRB):  L3-L5 LAMINECTOMY/FUSION/INSTRUMENTATION/TLIF/BONE GRAFT (N/A) 2 Days Post-Op  Precautions:  (no BLT, log roll technique, sitting 30-45 min, brace when up)  Chart, physical therapy assessment, plan of care and goals were reviewed. ASSESSMENT  Patient continues with skilled PT services and is progressing towards goals. Barriers to indep with mobility include ongoing O2 requirement, mild balance/ gait deficits, difficulty maintaining spinal precautions with bed mob. Pt received in chair, brace in place, 3L O2 with SpO2 94-95%, p78. Reviewed importance of regular IS use.  Pt transferred sit to stand with cues for pushing up from chair, CGA. Tolerated increased distance amb on unit with RW, 3L O2, SpO2 90% p88. Reviewed use of log roll sequence for return to supine; required 2 attempts and verbal/ tactile cues to maintain spinal precautions, light assist for lifting LEs onto bed with sit to L sidelying. Pt will benefit from con't practice with focus on maintaining precautions and sequence. Pt appropriate for mobilization as tolerated with RW, 3L O2, nursing assist. Will con't to follow for activity/ gait progression to include stair training. Recommend family assist and HH PT at d/c. Current Level of Function Impacting Discharge (mobility/balance): bed mob min A and cues, transfer CGA, amb with RW SBA    Other factors to consider for discharge: ongoing O2 requirement         PLAN :  Patient continues to benefit from skilled intervention to address the above impairments. Continue treatment per established plan of care. to address goals. Recommendation for discharge: (in order for the patient to meet his/her long term goals)  Physical therapy at least 2 days/week in the home AND ensure assist and/or supervision for safety with mobility    This discharge recommendation:  Has been made in collaboration with the attending provider and/or case management    IF patient discharges home will need the following DME: RW order in place       SUBJECTIVE:   Patient stated This is the hard part re bed mobility    OBJECTIVE DATA SUMMARY:   Critical Behavior:  Neurologic State: Alert  Orientation Level: Oriented X4  Cognition: Follows commands     Functional Mobility Training:  Bed Mobility:        Sit to Supine: Minimum assistance (cues for log roll sequence, light assist at LEs for sit to L sidelying)           Transfers:  Sit to Stand: Contact guard assistance  Stand to Sit: Stand-by assistance             Balance:  Sitting: Intact  Standing: Intact; With support  Standing - Static: Good;Constant support  Standing - Dynamic : Fair;Good;Constant support  Ambulation/Gait Training:  Distance (ft): 150 Feet (ft)  Assistive Device: Brace/Splint;Gait belt;Walker, rolling  Ambulation - Level of Assistance: Stand-by assistance        Gait Abnormalities: Decreased step clearance              Speed/Kamilah: Pace decreased (<100 feet/min)  Step Length: Left shortened;Right shortened        Pain Rating:  Pt reports manageable post-op pain    Activity Tolerance:   Good and desaturates with exertion and requires oxygen    After treatment patient left in no apparent distress:   Supine in bed, Call bell within reach, and Side rails x 3    COMMUNICATION/COLLABORATION:   The patients plan of care was discussed with: Registered nurse.      Panchito Hatfield, PT   Time Calculation: 23 mins

## 2023-03-22 NOTE — PROGRESS NOTES
Problem: Self Care Deficits Care Plan (Adult)  Goal: *Acute Goals and Plan of Care (Insert Text)  Description: FUNCTIONAL STATUS PRIOR TO ADMISSION: Patient was independent and active without use of DME.     HOME SUPPORT: The patient lived alone with son and his family to provide assistance as needed (will be staying with pt upon discharge). Occupational Therapy Goals  Initiated 3/21/2023  1. Patient will perform grooming with modified independence within 7 day(s). 2.  Patient will perform upper body dressing with modified independence within 7 day(s). 3.  Patient will perform lower body dressing with modified independence within 7 day(s). 4.  Patient will perform toilet transfers with modified independence within 7 day(s). 5.  Patient will perform all aspects of toileting with modified independence within 7 day(s). Outcome: Progressing Towards Goal   OCCUPATIONAL THERAPY TREATMENT  Patient: Giacomo Herman (36 y.o. female)  Date: 3/22/2023  Diagnosis: Lumbar stenosis [M48.061] <principal problem not specified>  Procedure(s) (LRB):  L3-L5 LAMINECTOMY/FUSION/INSTRUMENTATION/TLIF/BONE GRAFT (N/A) 2 Days Post-Op  Precautions:  (no BLT, log roll technique, sitting 30-45 min, brace when up)  Chart, occupational therapy assessment, plan of care, and goals were reviewed. ASSESSMENT  Patient continues with skilled OT services and is progressing towards goals. Pt ambulated to restroom, transfers to commode stand by assist. Stood at sink to wash hands. O2 sats on room air decreased with activity to 87%, reapplied cannula sats increase to 95%.  Pt educated as to AE for bathing and dressing, able to use sock aide after demonstration with stand by assist.      Current Level of Function Impacting Discharge (ADLs): contact guard toileting transfer, stand by assist hygiene at the sink, stand by assist LB dress with AE    Other factors to consider for discharge: pt will need hip kit and BSC for d/c         PLAN :  Patient continues to benefit from skilled intervention to address the above impairments. Continue treatment per established plan of care to address goals. Recommend with staff: ADL's, there ex, there act    Recommend next OT session: cont towards goals    Recommendation for discharge: (in order for the patient to meet his/her long term goals)  Occupational therapy at least 2 days/week in the home     This discharge recommendation:  Has not yet been discussed the attending provider and/or case management    IF patient discharges home will need the following DME:        SUBJECTIVE:   Patient stated I like this.  re sock aide    OBJECTIVE DATA SUMMARY:   Cognitive/Behavioral Status:  Neurologic State: Alert  Orientation Level: Oriented X4  Cognition: Follows commands             Functional Mobility and Transfers for ADLs:  Bed Mobility:  Sit to Supine: Minimum assistance (cues for log roll sequence, light assist at LEs for sit to L sidelying)    Transfers:  Sit to Stand: Contact guard assistance;Minimum assistance; Additional time  Functional Transfers  Toilet Transfer : Contact guard assistance       Balance:  Sitting: Intact  Standing: Impaired  Standing - Static: Good;Constant support  Standing - Dynamic : Fair;Good;Constant support    ADL Intervention:       Grooming  Grooming Assistance: Stand-by assistance  Position Performed: Standing  Washing Hands: Stand-by assistance         Type of Bath: Chlorhexidine (CHG); Bath Pack; Full              Lower Body Dressing Assistance  Dressing Assistance: Stand-by assistance  Socks: Stand-by assistance  Leg Crossed Method Used: No  Position Performed: Seated in chair  Adaptive Equipment Used: Sock aid    Toileting  Bladder Hygiene: Stand-by assistance       Activity Tolerance:   Fair    After treatment patient left in no apparent distress:   Sitting in chair and Call bell within reach    COMMUNICATION/COLLABORATION:   The patients plan of care was discussed with: Occupational therapist.     Verma Alpers Hite, COTA/L  Time Calculation: 21 mins

## 2023-03-22 NOTE — PROGRESS NOTES
Problem: Mobility Impaired (Adult and Pediatric)  Goal: *Acute Goals and Plan of Care (Insert Text)  Description: FUNCTIONAL STATUS PRIOR TO ADMISSION: Patient was independent and active without use of DME.    HOME SUPPORT PRIOR TO ADMISSION: The patient lived alone with no local support. Her son and DIL are to stay with her at discharge. Physical Therapy Goals  Initiated 3/21/2023    1. Patient will move from supine to sit and sit to supine  in bed with modified independence within 4 days. 2. Patient will perform sit to stand with modified independence within 4 days. 3. Patient will ambulate with modified independence for 150 feet with the least restrictive device within 4 days. 4. Patient will ascend/descend 6 stairs with 1 handrail(s) with modified independence within 4 days. 5. Patient will verbalize and demonstrate understanding of spinal precautions (No bending, lifting greater than 5 lbs, or twisting; log-roll technique; frequent repositioning as instructed) within 4 days. Outcome: Progressing Towards Goal   PHYSICAL THERAPY TREATMENT  Patient: Humberto Mckeon (76 y.o. female)  Date: 3/22/2023  Diagnosis: Lumbar stenosis [M48.061] <principal problem not specified>  Procedure(s) (LRB):  L3-L5 LAMINECTOMY/FUSION/INSTRUMENTATION/TLIF/BONE GRAFT (N/A) 2 Days Post-Op  Precautions:  (no BLT, log roll technique, sitting 30-45 min, brace when up)  Chart, physical therapy assessment, plan of care and goals were reviewed. ASSESSMENT  Patient continues with skilled PT services and is progressing towards goals. Pt received on RA [facility] to bed, brace in place. Noted SpO2 85-86% on RA, up to 88-89% with PLB. Remainder of session completed with O2; required titration to 3L to maintain SpO2 90% with activity. Gait training completed with RW this date. Pt transferred sit to stand with CGA/ min A for lift/ balance, increased time to reach upright.  Tolerated gait training on unit with RW, CGA, SpO2 90% p88 with 3L O2. Returned to bed at end of session in prep for PCT assist with bathing. Pt required cues for use of log roll sequence and assist for LEs to move sit to L sidelying to supine. Encouraged pt use of IS; instructed pt to  request assist with transfer to chair for lunch. Discussed pt O2 requirement with RN. Will con't to follow for mobility progression as tolerated. Current Level of Function Impacting Discharge (mobility/balance): transfer CGA/ min A, amb with RW CGA    Other factors to consider for discharge: requires O2 at this time         PLAN :  Patient continues to benefit from skilled intervention to address the above impairments. Continue treatment per established plan of care. to address goals. Recommendation for discharge: (in order for the patient to meet his/her long term goals)  Physical therapy at least 2 days/week in the home AND ensure assist and/or supervision for safety with mobility using RW at initial d/c    This discharge recommendation:  Has been made in collaboration with the attending provider and/or case management    IF patient discharges home will need the following DME: rolling walker       SUBJECTIVE:   Patient stated I'll need a walker when I get home.     OBJECTIVE DATA SUMMARY:   Critical Behavior:  Neurologic State: Alert  Orientation Level: Oriented X4  Cognition: Follows commands, Appropriate safety awareness, Appropriate for age attention/concentration, Appropriate decision making     Functional Mobility Training:  Bed Mobility:        Sit to Supine: Minimum assistance (cues for log roll sequence, light assist at LEs for sit to L sidelying)           Transfers:  Sit to Stand: Contact guard assistance;Minimum assistance; Additional time  Stand to Sit: Contact guard assistance                             Balance:  Sitting: Intact  Standing: Impaired  Standing - Static: Good;Constant support  Standing - Dynamic : Fair;Good;Constant support  Ambulation/Gait Training:  Distance (ft): 100 Feet (ft)  Assistive Device: Brace/Splint;Gait belt;Walker, rolling  Ambulation - Level of Assistance: Contact guard assistance        Gait Abnormalities: Decreased step clearance              Speed/Kamilah: Pace decreased (<100 feet/min)  Step Length: Left shortened;Right shortened        Pain Rating:  Pt reports post-op pain/ soreness, medicated prior    Activity Tolerance:   Fair and desaturates with exertion and requires oxygen    After treatment patient left in no apparent distress:   Supine in bed, Call bell within reach, Side rails x 3, and HOB elevated    COMMUNICATION/COLLABORATION:   The patients plan of care was discussed with: Registered nurse.      Mihaela Alonzo, PT   Time Calculation: 38 mins

## 2023-03-23 LAB
ANION GAP SERPL CALC-SCNC: 3 MMOL/L (ref 5–15)
BUN SERPL-MCNC: 8 MG/DL (ref 6–20)
BUN/CREAT SERPL: 12 (ref 12–20)
CALCIUM SERPL-MCNC: 8.7 MG/DL (ref 8.5–10.1)
CHLORIDE SERPL-SCNC: 107 MMOL/L (ref 97–108)
CO2 SERPL-SCNC: 30 MMOL/L (ref 21–32)
CREAT SERPL-MCNC: 0.69 MG/DL (ref 0.55–1.02)
GLUCOSE SERPL-MCNC: 104 MG/DL (ref 65–100)
HGB BLD-MCNC: 10.7 G/DL (ref 11.5–16)
POTASSIUM SERPL-SCNC: 4 MMOL/L (ref 3.5–5.1)
SODIUM SERPL-SCNC: 140 MMOL/L (ref 136–145)

## 2023-03-23 PROCEDURE — 74011250637 HC RX REV CODE- 250/637: Performed by: ORTHOPAEDIC SURGERY

## 2023-03-23 PROCEDURE — 97116 GAIT TRAINING THERAPY: CPT

## 2023-03-23 PROCEDURE — 97535 SELF CARE MNGMENT TRAINING: CPT

## 2023-03-23 PROCEDURE — 97530 THERAPEUTIC ACTIVITIES: CPT

## 2023-03-23 PROCEDURE — 36415 COLL VENOUS BLD VENIPUNCTURE: CPT

## 2023-03-23 PROCEDURE — 65270000029 HC RM PRIVATE

## 2023-03-23 PROCEDURE — 77010033678 HC OXYGEN DAILY

## 2023-03-23 PROCEDURE — 80048 BASIC METABOLIC PNL TOTAL CA: CPT

## 2023-03-23 PROCEDURE — 74011000250 HC RX REV CODE- 250: Performed by: ORTHOPAEDIC SURGERY

## 2023-03-23 PROCEDURE — 94761 N-INVAS EAR/PLS OXIMETRY MLT: CPT

## 2023-03-23 PROCEDURE — 85018 HEMOGLOBIN: CPT

## 2023-03-23 RX ADMIN — POLYETHYLENE GLYCOL 3350 17 G: 17 POWDER, FOR SOLUTION ORAL at 09:40

## 2023-03-23 RX ADMIN — HYDROMORPHONE HYDROCHLORIDE 4 MG: 2 TABLET ORAL at 12:25

## 2023-03-23 RX ADMIN — GABAPENTIN 300 MG: 300 CAPSULE ORAL at 18:20

## 2023-03-23 RX ADMIN — FAMOTIDINE 20 MG: 20 TABLET, FILM COATED ORAL at 18:20

## 2023-03-23 RX ADMIN — FAMOTIDINE 20 MG: 20 TABLET, FILM COATED ORAL at 09:40

## 2023-03-23 RX ADMIN — SENNOSIDES AND DOCUSATE SODIUM 1 TABLET: 50; 8.6 TABLET ORAL at 09:40

## 2023-03-23 RX ADMIN — HYDROMORPHONE HYDROCHLORIDE 4 MG: 2 TABLET ORAL at 22:45

## 2023-03-23 RX ADMIN — HYDROMORPHONE HYDROCHLORIDE 4 MG: 2 TABLET ORAL at 06:37

## 2023-03-23 RX ADMIN — Medication 10 ML: at 14:00

## 2023-03-23 RX ADMIN — Medication 10 ML: at 06:38

## 2023-03-23 RX ADMIN — AMLODIPINE BESYLATE 5 MG: 5 TABLET ORAL at 09:40

## 2023-03-23 RX ADMIN — GABAPENTIN 300 MG: 300 CAPSULE ORAL at 09:39

## 2023-03-23 RX ADMIN — CHOLECALCIFEROL TAB 125 MCG (5000 UNIT) 10000 UNITS: 125 TAB at 09:39

## 2023-03-23 RX ADMIN — Medication 10 ML: at 22:00

## 2023-03-23 RX ADMIN — ESCITALOPRAM OXALATE 10 MG: 10 TABLET ORAL at 09:40

## 2023-03-23 RX ADMIN — HYDROMORPHONE HYDROCHLORIDE 4 MG: 2 TABLET ORAL at 16:51

## 2023-03-23 RX ADMIN — SENNOSIDES AND DOCUSATE SODIUM 1 TABLET: 50; 8.6 TABLET ORAL at 18:20

## 2023-03-23 NOTE — PROGRESS NOTES
Problem: Mobility Impaired (Adult and Pediatric)  Goal: *Acute Goals and Plan of Care (Insert Text)  Description: FUNCTIONAL STATUS PRIOR TO ADMISSION: Patient was independent and active without use of DME.    HOME SUPPORT PRIOR TO ADMISSION: The patient lived alone with no local support. Her son and DIL are to stay with her at discharge. Physical Therapy Goals  Initiated 3/21/2023    1. Patient will move from supine to sit and sit to supine  in bed with modified independence within 4 days. 2. Patient will perform sit to stand with modified independence within 4 days. 3. Patient will ambulate with modified independence for 150 feet with the least restrictive device within 4 days. 4. Patient will ascend/descend 6 stairs with 1 handrail(s) with modified independence within 4 days. 5. Patient will verbalize and demonstrate understanding of spinal precautions (No bending, lifting greater than 5 lbs, or twisting; log-roll technique; frequent repositioning as instructed) within 4 days. Outcome: Progressing Towards Goal  Note:   PHYSICAL THERAPY TREATMENT  Patient: Mei Gilliland (64 y.o. female)  Date: 3/23/2023  Diagnosis: Lumbar stenosis [M48.061] <principal problem not specified>  Procedure(s) (LRB):  L3-L5 LAMINECTOMY/FUSION/INSTRUMENTATION/TLIF/BONE GRAFT (N/A) 3 Days Post-Op  Precautions:  (no BLT, log roll technique, sitting 30-45 min, brace when up) No bending, no lifting greater than 5 lbs, no twisting, log-roll technique, repositioning every 20-30 min except when sleeping, brace when OOB (if ordered)  Chart, physical therapy assessment, plan of care and goals were reviewed. ASSESSMENT  Patient continues with skilled PT services and progressing towards goals. SBA for functional tranfers and gait training using RW. Verbalized and maintained back precautions throughout session.  Pt required 2L with activity to maintain O2 sat 90% denies SOB with activity   Pulse oximetry assessment   89% at rest on room air (if 88% or less, skip next steps)  87% while ambulating on room air  90% while ambulating on 2LPM    .     Current Level of Function Impacting Discharge (mobility/balance): SBA    Other factors to consider for discharge:          PLAN :  Patient continues to benefit from skilled intervention to address the above impairments. Continue treatment per established plan of care. to address goals. Recommendation for discharge: (in order for the patient to meet his/her long term goals)  Physical therapy at least 2 days/week in the home     This discharge recommendation:  Has been made in collaboration with the attending provider and/or case management    IF patient discharges home will need the following DME: rolling walker       SUBJECTIVE:   Patient stated doing ok.     OBJECTIVE DATA SUMMARY:   Critical Behavior:  Neurologic State: Alert  Orientation Level: Oriented X4  Cognition: Follows commands, Appropriate decision making       Spinal diagnosis intervention:  The patient stated 3/3 back precautions when prompted. Reviewed all 3 back precautions, log roll technique, and sitting for 30 minutes at a time. The patient required few cues to maintain back precautions during functional activity. Reviewed back brace application and wear schedule. Brace donned with modified [place]      Functional Mobility Training:    Bed Mobility:  Log                   Transfers:  Sit to Stand: Stand-by assistance  Stand to Sit: Stand-by assistance                             Balance:  Sitting: Intact  Standing: Intact; With support  Ambulation/Gait Training:  Distance (ft): 350 Feet (ft)  Assistive Device: Walker, rolling;Gait belt;Brace/Splint  Ambulation - Level of Assistance: Stand-by assistance                       Speed/Kamilah: Pace decreased (<100 feet/min)                       Stairs:               Therapeutic Exercises:     Pain Rating:  3/10    Activity Tolerance:   Good and desaturates with exertion and requires oxygen    After treatment patient left in no apparent distress:   Sitting in chair and Call bell within reach    COMMUNICATION/COLLABORATION:   The patients plan of care was discussed with: Registered nurse.      Andrea Mcmanus   Time Calculation: 27 mins

## 2023-03-23 NOTE — PROGRESS NOTES
Bedside shift change report given to Jessie Crockett RN (oncoming nurse) by Emilia Valverde RN (offgoing nurse). Report included the following information SBAR, Kardex, Intake/Output, MAR, Recent Results. Kristian Cain

## 2023-03-23 NOTE — PROGRESS NOTES
Problem: Self Care Deficits Care Plan (Adult)  Goal: *Acute Goals and Plan of Care (Insert Text)  Description: FUNCTIONAL STATUS PRIOR TO ADMISSION: Patient was independent and active without use of DME.     HOME SUPPORT: The patient lived alone with son and his family to provide assistance as needed (will be staying with pt upon discharge). Occupational Therapy Goals  Initiated 3/21/2023  1. Patient will perform grooming with modified independence within 7 day(s). 2.  Patient will perform upper body dressing with modified independence within 7 day(s). 3.  Patient will perform lower body dressing with modified independence within 7 day(s). 4.  Patient will perform toilet transfers with modified independence within 7 day(s). 5.  Patient will perform all aspects of toileting with modified independence within 7 day(s). Outcome: Progressing Towards Goal   OCCUPATIONAL THERAPY TREATMENT  Patient: Carrillo Currie (22 y.o. female)  Date: 3/23/2023  Diagnosis: Lumbar stenosis [M48.061] <principal problem not specified>  Procedure(s) (LRB):  L3-L5 LAMINECTOMY/FUSION/INSTRUMENTATION/TLIF/BONE GRAFT (N/A) 3 Days Post-Op  Precautions:  (no BLT, log roll technique, sitting 30-45 min, brace when up)  Chart, occupational therapy assessment, plan of care, and goals were reviewed. ASSESSMENT  Patient continues with skilled OT services and is progressing towards goals. Pt motivated towards getting dressed. Pt stood at sink for bathing UB and grooming with stand by assist. She returned to sit in chair and donned LB clothing with AE stand by assist. O2 sats 92% on 3 L, drops to 87% on room air during standing ADL's. Pt needs verbal cueing not to twist during self care tasks.      Current Level of Function Impacting Discharge (ADLs): stand by assist UB bathe and grooming, set-up with AE for LB dress    Other factors to consider for discharge: needs hip kit         PLAN :  Patient continues to benefit from skilled intervention to address the above impairments. Continue treatment per established plan of care to address goals. Recommend with staff: ADL's, there ex, there act    Recommend next OT session: cont towards goals    Recommendation for discharge: (in order for the patient to meet his/her long term goals)  Occupational therapy at least 2 days/week in the home     This discharge recommendation:  Has not been discussed with care manager    IF patient discharges home will need the following DME: AE for self care, BSC       SUBJECTIVE:   Patient stated It is amazing how getting dressed can make you feel better.     OBJECTIVE DATA SUMMARY:   Cognitive/Behavioral Status:  Neurologic State: Alert     Cognition: Follows commands; Appropriate decision making             Functional Mobility and Transfers for ADLs:  Bed Mobility:   Stand by assist supine to sit, verbal cueing not to twist    Transfers:  Sit to Stand: Stand-by assistance  Functional Transfers  Toilet Transfer : Stand-by assistance  Adaptive Equipment: Grab bars       Balance:  Sitting: Intact  Standing: Intact; With support    ADL Intervention:       Grooming  Grooming Assistance: Stand-by assistance  Position Performed: Standing  Brushing Teeth: Stand-by assistance    Upper Body Bathing  Bathing Assistance: Stand-by assistance  Position Performed: Standing              Upper Body Dressing Assistance  Dressing Assistance: Stand-by assistance  Pullover Shirt: Stand-by assistance    Lower Body Dressing Assistance  Dressing Assistance: Stand-by assistance  Underpants: Stand-by assistance  Socks: Set-up  Slip on Shoes with Back: Set-up  Position Performed: Seated in chair  Adaptive Equipment Used: Dressing stick; Reacher;Sock aid; Walker    Toileting  Bladder Hygiene: Set-up       Activity Tolerance:   Good    After treatment patient left in no apparent distress:   Sitting in chair and Call bell within reach    COMMUNICATION/COLLABORATION:   The patients plan of care was discussed with: Physical therapy assistant and Occupational therapist.     TAI Trejo  Time Calculation: 30 mins

## 2023-03-23 NOTE — PROGRESS NOTES
Problem: Mobility Impaired (Adult and Pediatric)  Goal: *Acute Goals and Plan of Care (Insert Text)  Description: FUNCTIONAL STATUS PRIOR TO ADMISSION: Patient was independent and active without use of DME.    HOME SUPPORT PRIOR TO ADMISSION: The patient lived alone with no local support. Her son and DIL are to stay with her at discharge. Physical Therapy Goals  Initiated 3/21/2023    1. Patient will move from supine to sit and sit to supine  in bed with modified independence within 4 days. 2. Patient will perform sit to stand with modified independence within 4 days. 3. Patient will ambulate with modified independence for 150 feet with the least restrictive device within 4 days. 4. Patient will ascend/descend 6 stairs with 1 handrail(s) with modified independence within 4 days. 5. Patient will verbalize and demonstrate understanding of spinal precautions (No bending, lifting greater than 5 lbs, or twisting; log-roll technique; frequent repositioning as instructed) within 4 days. 3/23/2023 1634 by Edgardo Bustamante  Outcome: Progressing Towards Goal  Note:   PHYSICAL THERAPY TREATMENT  Patient: Will Cortes (24 y.o. female)  Date: 3/23/2023  Diagnosis: Lumbar stenosis [M48.061] <principal problem not specified>  Procedure(s) (LRB):  L3-L5 LAMINECTOMY/FUSION/INSTRUMENTATION/TLIF/BONE GRAFT (N/A) 3 Days Post-Op  Precautions:  (no BLT, log roll technique, sitting 30-45 min, brace when up) No bending, no lifting greater than 5 lbs, no twisting, log-roll technique, repositioning every 20-30 min except when sleeping, brace when OOB (if ordered)  Chart, physical therapy assessment, plan of care and goals were reviewed. ASSESSMENT  Patient continues with skilled PT services and progressing towards goals. CGa/SBA for functional mobility tasks, able to don/doff LSO independently. Verbalized and maintained back precautions throughout session  Usign 1.5L O2 with activity O2 sat 90%.  Denies SOB Ascend/descend 6 steps using B handrail support with CGA . Pt is cleared for discharge from PT standpoint when medically stable    Current Level of Function Impacting Discharge (mobility/balance): SBA    Other factors to consider for discharge:          PLAN :  Patient continues to benefit from skilled intervention to address the above impairments. Continue treatment per established plan of care. to address goals. Recommendation for discharge: (in order for the patient to meet his/her long term goals)  Physical therapy at least 2 days/week in the home     This discharge recommendation:  Has been made in collaboration with the attending provider and/or case management    IF patient discharges home will need the following DME: RW       SUBJECTIVE:   Patient stated doing ok.     OBJECTIVE DATA SUMMARY:   Critical Behavior:  Neurologic State: Alert, Appropriate for age  Orientation Level: Oriented X4  Cognition: Appropriate decision making, Appropriate for age attention/concentration, Appropriate safety awareness, Follows commands       Spinal diagnosis intervention:  The patient stated 3/3 back precautions when prompted. Reviewed all 3 back precautions, log roll technique, and sitting for 30 minutes at a time. The patient required few cues to maintain back precautions during functional activity. Reviewed back brace application and wear schedule. Brace donned with modified [place]      Functional Mobility Training:    Bed Mobility:  Log    Supine to Sit: Additional time;Stand-by assistance;Assist x1  Sit to Supine: Stand-by assistance           Transfers:  Sit to Stand: Stand-by assistance  Stand to Sit: Stand-by assistance                             Balance:  Sitting: Intact  Standing: Intact; With support  Ambulation/Gait Training:  Distance (ft): 300 Feet (ft)  Assistive Device: Walker, rolling;Gait belt;Brace/Splint  Ambulation - Level of Assistance: Stand-by assistance                       Speed/Kamilah: Pace decreased (<100 feet/min)                       Stairs:  Number of Stairs Trained: 6  Stairs - Level of Assistance: Contact guard assistance   Rail Use: Both    Therapeutic Exercises:     Pain Rating:  3/10    Activity Tolerance:   Good    After treatment patient left in no apparent distress:   Supine in bed and Call bell within reach    COMMUNICATION/COLLABORATION:   The patients plan of care was discussed with: Registered nurse. Kai Woods   Time Calculation: 33 mins          3/23/2023 1109 by Edel Keith  Outcome: Progressing Towards Goal  Note:   PHYSICAL THERAPY TREATMENT  Patient: Mil Bauer (89 y.o. female)  Date: 3/23/2023  Diagnosis: Lumbar stenosis [M48.061] <principal problem not specified>  Procedure(s) (LRB):  L3-L5 LAMINECTOMY/FUSION/INSTRUMENTATION/TLIF/BONE GRAFT (N/A) 3 Days Post-Op  Precautions:  (no BLT, log roll technique, sitting 30-45 min, brace when up) No bending, no lifting greater than 5 lbs, no twisting, log-roll technique, repositioning every 20-30 min except when sleeping, brace when OOB (if ordered)  Chart, physical therapy assessment, plan of care and goals were reviewed. ASSESSMENT  Patient continues with skilled PT services and progressing towards goals. SBA for functional tranfers and gait training using RW. Verbalized and maintained back precautions throughout session. Pt required 2L with activity to maintain O2 sat 90% denies SOB with activity   Pulse oximetry assessment   89% at rest on room air (if 88% or less, skip next steps)  87% while ambulating on room air  90% while ambulating on 2LPM    .     Current Level of Function Impacting Discharge (mobility/balance): SBA    Other factors to consider for discharge:          PLAN :  Patient continues to benefit from skilled intervention to address the above impairments. Continue treatment per established plan of care. to address goals.     Recommendation for discharge: (in order for the patient to meet his/her long term goals)  Physical therapy at least 2 days/week in the home     This discharge recommendation:  Has been made in collaboration with the attending provider and/or case management    IF patient discharges home will need the following DME: rolling walker       SUBJECTIVE:   Patient stated doing ok.     OBJECTIVE DATA SUMMARY:   Critical Behavior:  Neurologic State: Alert  Orientation Level: Oriented X4  Cognition: Follows commands, Appropriate decision making       Spinal diagnosis intervention:  The patient stated 3/3 back precautions when prompted. Reviewed all 3 back precautions, log roll technique, and sitting for 30 minutes at a time. The patient required few cues to maintain back precautions during functional activity. Reviewed back brace application and wear schedule. Brace donned with modified [place]      Functional Mobility Training:    Bed Mobility:  Log                   Transfers:  Sit to Stand: Stand-by assistance  Stand to Sit: Stand-by assistance                             Balance:  Sitting: Intact  Standing: Intact; With support  Ambulation/Gait Training:  Distance (ft): 350 Feet (ft)  Assistive Device: Walker, rolling;Gait belt;Brace/Splint  Ambulation - Level of Assistance: Stand-by assistance                       Speed/Kamilah: Pace decreased (<100 feet/min)                       Stairs: Therapeutic Exercises:     Pain Rating:  3/10    Activity Tolerance:   Good and desaturates with exertion and requires oxygen    After treatment patient left in no apparent distress:   Sitting in chair and Call bell within reach    COMMUNICATION/COLLABORATION:   The patients plan of care was discussed with: Registered nurseMaryam Bowman   Time Calculation: 27 mins

## 2023-03-23 NOTE — PROGRESS NOTES
ORTHOPAEDIC LUMBAR FUSION PROGRESS NOTE    NAME:     Sha Garcia   :       1955   MRN:       [id number]   DATE:      3/23/2023    POD:    3 Days Post-Op  S/P:    Procedure(s):  L3-L5 LAMINECTOMY/FUSION/INSTRUMENTATION/TLIF/BONE GRAFT    SUBJECTIVE:    C/O back pain along surgical incision  No leg pain or numbness  Denies nausea/vomiting, headache, chest pain or shortness of breath. Denies cough  Pain controlled    XR CHEST PORT: Patient Communication     Released  Not seen     Study Result    Narrative & Impression   EXAM: XR CHEST PORT     DATE: 3/22/2023 5:32 PM     INDICATION: Post op hypoxia. Pt asymptomatic. Stopped smoking 3/9/23. COMPARISON: None. FINDINGS: AP portable chest radiograph. The lungs are adequately expanded. The  heart size is within normal limits. There is no focal airspace consolidation. The vascular clarity is within normal limits. There is no evidence of pleural  effusion or pneumothorax. No displaced fracture is seen. Clips from prior  cholecystectomy are seen in the upper abdomen. IMPRESSION  No acute cardiopulmonary findings. Recent Labs     23  0331   HGB 10.7*      K 4.0      CO2 30   BUN 8   CREA 0.69   *     Patient Vitals for the past 12 hrs:   BP Temp Pulse Resp SpO2   23 1221 118/63 -- 76 -- --   23 1101 (!) 93/58 97.5 °F (36.4 °C) 76 18 94 %   23 0855 102/62 98.2 °F (36.8 °C) 77 16 95 %   23 0807 -- -- -- -- 96 %   23 0416 105/65 99 °F (37.2 °C) 76 17 95 %       EXAM:  Dressings clean, dry and intact   Positive strength/ROM bilat lower ext.   Neuro intact to sensation  Calves, soft & nontender  BL LEs NVID      PLAN:  Continue prn PO pain medications  Monitor hemovac drain output  Maintain SCDs  PT/OT, OOB w/ assist  Tolerating diet  Encouraged use of IS      RAEANN Nicholas  Orthopaedic Surgery  Physician Assistant to Dr. Nisha Felipe

## 2023-03-23 NOTE — PROGRESS NOTES
3/23/2023  1:34 PM  Care Management Progress Note      RUR:  3%  Risk Level: [x]Low []Moderate []High  Value-based purchasing: [] Yes [x] No  Bundle patient: [] Yes [x] No   Specify:     Transition of care plan:  Awaiting medical clearance and DC order. PT/OT treating. Home with [facility] -  awaiting [employer] to provide agency name for [facility] services. Ordered DME will be delivered to pt's home today, and pt is aware. Outpatient follow-up. Pt's family to transport.

## 2023-03-24 ENCOUNTER — APPOINTMENT (OUTPATIENT)
Dept: CT IMAGING | Age: 68
DRG: 453 | End: 2023-03-24
Attending: INTERNAL MEDICINE
Payer: COMMERCIAL

## 2023-03-24 LAB
ANION GAP SERPL CALC-SCNC: 2 MMOL/L (ref 5–15)
BUN SERPL-MCNC: 11 MG/DL (ref 6–20)
BUN/CREAT SERPL: 20 (ref 12–20)
CALCIUM SERPL-MCNC: 8.4 MG/DL (ref 8.5–10.1)
CHLORIDE SERPL-SCNC: 105 MMOL/L (ref 97–108)
CO2 SERPL-SCNC: 31 MMOL/L (ref 21–32)
CREAT SERPL-MCNC: 0.56 MG/DL (ref 0.55–1.02)
GLUCOSE SERPL-MCNC: 105 MG/DL (ref 65–100)
HGB BLD-MCNC: 10.1 G/DL (ref 11.5–16)
POTASSIUM SERPL-SCNC: 3.8 MMOL/L (ref 3.5–5.1)
SODIUM SERPL-SCNC: 138 MMOL/L (ref 136–145)

## 2023-03-24 PROCEDURE — 77010033678 HC OXYGEN DAILY

## 2023-03-24 PROCEDURE — 65270000029 HC RM PRIVATE

## 2023-03-24 PROCEDURE — 74011000250 HC RX REV CODE- 250: Performed by: ORTHOPAEDIC SURGERY

## 2023-03-24 PROCEDURE — 94761 N-INVAS EAR/PLS OXIMETRY MLT: CPT

## 2023-03-24 PROCEDURE — 74011000636 HC RX REV CODE- 636: Performed by: ORTHOPAEDIC SURGERY

## 2023-03-24 PROCEDURE — 97530 THERAPEUTIC ACTIVITIES: CPT

## 2023-03-24 PROCEDURE — 80048 BASIC METABOLIC PNL TOTAL CA: CPT

## 2023-03-24 PROCEDURE — 36415 COLL VENOUS BLD VENIPUNCTURE: CPT

## 2023-03-24 PROCEDURE — 71275 CT ANGIOGRAPHY CHEST: CPT

## 2023-03-24 PROCEDURE — 97116 GAIT TRAINING THERAPY: CPT

## 2023-03-24 PROCEDURE — 74011250637 HC RX REV CODE- 250/637: Performed by: ORTHOPAEDIC SURGERY

## 2023-03-24 PROCEDURE — 85018 HEMOGLOBIN: CPT

## 2023-03-24 PROCEDURE — 97535 SELF CARE MNGMENT TRAINING: CPT

## 2023-03-24 RX ADMIN — SENNOSIDES AND DOCUSATE SODIUM 1 TABLET: 50; 8.6 TABLET ORAL at 08:34

## 2023-03-24 RX ADMIN — Medication 10 ML: at 06:41

## 2023-03-24 RX ADMIN — HYDROMORPHONE HYDROCHLORIDE 2 MG: 2 TABLET ORAL at 08:41

## 2023-03-24 RX ADMIN — GABAPENTIN 300 MG: 300 CAPSULE ORAL at 17:26

## 2023-03-24 RX ADMIN — Medication 10 ML: at 14:00

## 2023-03-24 RX ADMIN — HYDROMORPHONE HYDROCHLORIDE 4 MG: 2 TABLET ORAL at 22:26

## 2023-03-24 RX ADMIN — IOPAMIDOL 80 ML: 755 INJECTION, SOLUTION INTRAVENOUS at 19:14

## 2023-03-24 RX ADMIN — CHOLECALCIFEROL TAB 125 MCG (5000 UNIT) 10000 UNITS: 125 TAB at 08:34

## 2023-03-24 RX ADMIN — FAMOTIDINE 20 MG: 20 TABLET, FILM COATED ORAL at 17:26

## 2023-03-24 RX ADMIN — POLYETHYLENE GLYCOL 3350 17 G: 17 POWDER, FOR SOLUTION ORAL at 08:34

## 2023-03-24 RX ADMIN — AMLODIPINE BESYLATE 5 MG: 5 TABLET ORAL at 08:34

## 2023-03-24 RX ADMIN — SENNOSIDES AND DOCUSATE SODIUM 1 TABLET: 50; 8.6 TABLET ORAL at 17:26

## 2023-03-24 RX ADMIN — ESCITALOPRAM OXALATE 10 MG: 10 TABLET ORAL at 08:34

## 2023-03-24 RX ADMIN — FAMOTIDINE 20 MG: 20 TABLET, FILM COATED ORAL at 08:34

## 2023-03-24 RX ADMIN — GABAPENTIN 300 MG: 300 CAPSULE ORAL at 08:34

## 2023-03-24 RX ADMIN — HYDROMORPHONE HYDROCHLORIDE 2 MG: 2 TABLET ORAL at 18:49

## 2023-03-24 NOTE — PROGRESS NOTES
Problem: Mobility Impaired (Adult and Pediatric)  Goal: *Acute Goals and Plan of Care (Insert Text)  Description: FUNCTIONAL STATUS PRIOR TO ADMISSION: Patient was independent and active without use of DME.    HOME SUPPORT PRIOR TO ADMISSION: The patient lived alone with no local support. Her son and DIL are to stay with her at discharge. Physical Therapy Goals  Initiated 3/21/2023    1. Patient will move from supine to sit and sit to supine  in bed with modified independence within 4 days. 2. Patient will perform sit to stand with modified independence within 4 days. 3. Patient will ambulate with modified independence for 150 feet with the least restrictive device within 4 days. 4. Patient will ascend/descend 6 stairs with 1 handrail(s) with modified independence within 4 days. 5. Patient will verbalize and demonstrate understanding of spinal precautions (No bending, lifting greater than 5 lbs, or twisting; log-roll technique; frequent repositioning as instructed) within 4 days. Outcome: Progressing Towards Goal  Note:   PHYSICAL THERAPY TREATMENT  Patient: Kinsey Herrera (28 y.o. female)  Date: 3/24/2023  Diagnosis: Lumbar stenosis [M48.061] <principal problem not specified>  Procedure(s) (LRB):  L3-L5 LAMINECTOMY/FUSION/INSTRUMENTATION/TLIF/BONE GRAFT (N/A) 4 Days Post-Op  Precautions:  (no BLT, log roll technique, sitting 30-45 min, brace when up)  Chart, physical therapy assessment, plan of care and goals were reviewed. ASSESSMENT  Patient continues with skilled PT services and progressing towards goals. No instability noted with mobility with use of RW. Requires up to CGA for managing BLE sit>supine into bed height similar to home. Supervision for mobility tasks. Continues to require 2L O2 to maintain O2 sat 90% with activity,pt denies feeling SOB, dizzy, lightheaded.  Pt is cleared for discharge form PT standpoint when medically stable    Pulse oximetry assessment   87% at rest on room air (if 88% or less, skip next steps)  90% while ambulating on 2LPM   93% at rest on 2LPM     Current Level of Function Impacting Discharge (mobility/balance): SBA    Other factors to consider for discharge:          PLAN :  Patient continues to benefit from skilled intervention to address the above impairments. Continue treatment per established plan of care. to address goals. Recommendation for discharge: (in order for the patient to meet his/her long term goals)  Physical therapy at least 2 days/week in the home     This discharge recommendation:  Has been made in collaboration with the attending provider and/or case management    IF patient discharges home will need the following DME: rolling walker       SUBJECTIVE:   Patient stated doing ok.     OBJECTIVE DATA SUMMARY:   Critical Behavior:  Neurologic State: Alert, Eyes open spontaneously  Orientation Level: Oriented X4  Cognition: Follows commands, Appropriate decision making     Functional Mobility Training:  Bed Mobility:     Supine to Sit: Stand-by assistance  Sit to Supine: Contact guard assistance           Transfers:  Sit to Stand: Stand-by assistance;Supervision  Stand to Sit: Supervision                             Balance:  Sitting: Intact  Standing: Intact; With support  Standing - Static: Constant support;Good  Standing - Dynamic : Constant support;Good  Ambulation/Gait Training:  Distance (ft): 500 Feet (ft)  Assistive Device: Walker, rolling;Gait belt;Brace/Splint  Ambulation - Level of Assistance: Supervision                       Speed/Kamilah: Pace decreased (<100 feet/min)                       Stairs:   Performed 3/23           Therapeutic Exercises:     Pain Ratin/10    Activity Tolerance:   Good    After treatment patient left in no apparent distress:   Supine in bed and Call bell within reach    COMMUNICATION/COLLABORATION:   The patients plan of care was discussed with: Registered nurse. Nabil Shah   Time Calculation: 27 mins

## 2023-03-24 NOTE — PROGRESS NOTES
3/24/2023  3:46 PM    Care Management Progress Note      RUR:  3%  Risk Level: [x]Low []Moderate []High  Value-based purchasing: [] Yes [x] No  Bundle patient: [] Yes [x] No   Specify:     Transition of care plan:  Awaiting medical clearance and Dc order. PT/OT treating. Home with HH arranged through [employer], and they will call pt to schedule. Home O2 portable tank will be delivered to pt's room Sat AM through [employer]. Outpatient follow-up. Pt's family to transport. Care Management Interventions  PCP Verified by CM: Yes  Mode of Transport at Discharge:  Other (see comment)  Transition of Care Consult (CM Consult): Discharge Planning  MyChart Signup: No  Discharge Durable Medical Equipment: Yes  Physical Therapy Consult: Yes  Occupational Therapy Consult: Yes  Speech Therapy Consult: No  Support Systems: Child(niko), Other Family Member(s)  Confirm Follow Up Transport: Family  Discharge Location  Patient Expects to be Discharged to[de-identified] Home with home health

## 2023-03-24 NOTE — CONSULTS
Name: [address] [place] [address] Street: [facility] Jie Kendall   : 1955 Admit Date: 3/20/2023   Phone: [phone]  Room: [facility]   PCP: Luc Mendoza MD  MRN: [id number]   Date: 3/24/2023  Code: No Order          Chart and notes reviewed. Data reviewed. I review the patient's current medications in the medical record at each encounter. I have evaluated and examined the patient. HPI:    4:23 PM       History was obtained from patient. I was asked by Sherrie Lazar MD to see Dwaynelukas Childers in consultation for a chief complaint of hypoxia. History of Present Illness:  Ms. Gabrielle Conner is a 73 yo woman with a history of HTN and spinal stenosis who is admitted for lumbar laminectomy who has had post-op hypoxia. She denies shortness of breath, cough, wheezing, chest pain/tightness or edema. No history of prior hypoxia or underlying lung disease.      Labs: WBC 10.1, cr 0.56,     Images reviewed:  CXR 3/22/2023: lungs clear, no acute or cardiopulmonary process      Past Medical History:   Diagnosis Date    Anxiety and depression     Hypertension     Migraines     Nausea & vomiting     PONV       Past Surgical History:   Procedure Laterality Date    HX COLONOSCOPY  2019    HX HYSTERECTOMY      ovaries intact    HX LAP CHOLECYSTECTOMY      HX WISDOM TEETH EXTRACTION         Family History   Problem Relation Age of Onset    Cancer Mother         cervical cancer    Hypertension Mother     Diabetes Father     Hypertension Father     Heart Disease Father     Arrhythmia Father         pacemaker    Hypertension Sister     Heart Disease Sister     Asthma Sister     Hypertension Sister     Heart Disease Brother         MI at age 54    No Known Problems Brother     No Known Problems Brother        Social History     Tobacco Use    Smoking status: Former     Packs/day: 0.50     Years: 30.00     Pack years: 15.00     Types: Cigarettes     Quit date: 3/9/2023     Years since quittin.0    Smokeless tobacco: Never   Substance Use Topics    Alcohol use: Yes     Comment: 5-6 drinks in a year       Allergies   Allergen Reactions    Percocet [Oxycodone-Acetaminophen] Itching       Current Facility-Administered Medications   Medication Dose Route Frequency    prochlorperazine (COMPAZINE) injection 5 mg  5 mg IntraVENous Q4H PRN    gabapentin (NEURONTIN) capsule 300 mg  300 mg Oral BID    amLODIPine (NORVASC) tablet 5 mg  5 mg Oral DAILY    escitalopram oxalate (LEXAPRO) tablet 10 mg  10 mg Oral DAILY    cholecalciferol (VITAMIN D3) (5000 Units/125 mcg) tablet 10,000 Units  10,000 Units Oral DAILY    sodium chloride (NS) flush 5-40 mL  5-40 mL IntraVENous Q8H    sodium chloride (NS) flush 5-40 mL  5-40 mL IntraVENous PRN    naloxone (NARCAN) injection 0.4 mg  0.4 mg IntraVENous PRN    senna-docusate (PERICOLACE) 8.6-50 mg per tablet 1 Tablet  1 Tablet Oral BID    polyethylene glycol (MIRALAX) packet 17 g  17 g Oral DAILY    bisacodyL (DULCOLAX) suppository 10 mg  10 mg Rectal DAILY PRN    acetaminophen (TYLENOL) tablet 650 mg  650 mg Oral Q6H PRN    HYDROmorphone (DILAUDID) tablet 2 mg  2 mg Oral Q4H PRN    HYDROmorphone (DILAUDID) tablet 4 mg  4 mg Oral Q4H PRN    famotidine (PEPCID) tablet 20 mg  20 mg Oral BID    diphenhydrAMINE (BENADRYL) injection 12.5 mg  12.5 mg IntraVENous Q6H PRN    cyclobenzaprine (FLEXERIL) tablet 10 mg  10 mg Oral TID PRN         REVIEW OF SYSTEMS   12 point ROS negative except as stated in the HPI. Physical Exam:   Visit Vitals  /66 (BP 1 Location: Right upper arm, BP Patient Position: At rest)   Pulse 75   Temp 98.6 °F (37 °C)   Resp 16   Ht 5' 6\" (1.676 m)   Wt 72 kg (158 lb 11.7 oz)   SpO2 93%   BMI 25.62 kg/m²       General:  Alert, cooperative, no distress, appears stated age. Head:  Normocephalic, without obvious abnormality, atraumatic. Eyes:  Conjunctivae/corneas clear. Nose: Nares normal. Septum midline.    Throat: Lips, mucosa, and tongue normal.    Neck: Supple, symmetrical, trachea midline   Lungs:   Clear to auscultation bilaterally. Chest wall:  No tenderness or deformity. Heart:  Regular rate and rhythm, S1, S2 normal, no murmur, click, rub or gallop. Abdomen:   Soft, non-tender. Bowel sounds normal.    Extremities: Extremities normal, atraumatic, no cyanosis or edema. Pulses: 2+ and symmetric all extremities. Skin: Skin color, texture, turgor normal. No rashes or lesions       Neurologic: Grossly nonfocal       Lab Results   Component Value Date/Time    Sodium 138 03/24/2023 06:30 AM    Potassium 3.8 03/24/2023 06:30 AM    Chloride 105 03/24/2023 06:30 AM    CO2 31 03/24/2023 06:30 AM    BUN 11 03/24/2023 06:30 AM    Creatinine 0.56 03/24/2023 06:30 AM    Glucose 105 (H) 03/24/2023 06:30 AM    Calcium 8.4 (L) 03/24/2023 06:30 AM       Lab Results   Component Value Date/Time    WBC 5.0 03/07/2023 10:34 AM    HGB 10.1 (L) 03/24/2023 06:30 AM    PLATELET 279 77/95/9198 10:34 AM    .1 (H) 03/07/2023 10:34 AM       Lab Results   Component Value Date/Time    INR 1.0 03/07/2023 10:34 AM    aPTT 25.6 03/07/2023 10:34 AM    Alk. phosphatase 92 03/07/2023 10:34 AM    Protein, total 7.3 03/07/2023 10:34 AM    Albumin 4.0 03/07/2023 10:34 AM    Globulin 3.3 03/07/2023 10:34 AM       No results found for: IRON, FE, TIBC, IBCT, PSAT, FERR    No results found for: SR, CRP, NADER, ANAIGG, RA, RPR, RPRT, VDRLT, VDRLS, TSH, TSHEXT     No results found for: PH, PHI, PCO2, PCO2I, PO2, PO2I, HCO3, HCO3I, FIO2, FIO2I    No results found for: CPK, RCK1, RCK2, RCK3, RCK4, CKNDX, CKND1, TROPT, TROIQ, BNPP, BNP     Lab Results   Component Value Date/Time    Culture result: MRSA NOT PRESENT 03/07/2023 10:34 AM    Culture result:  03/07/2023 10:34 AM     Screening of patient nares for MRSA is for surveillance purposes and, if positive, to facilitate isolation considerations in high risk settings.  It is not intended for automatic decolonization interventions per se as regimens are not sufficiently effective to warrant routine use. No results found for: TOXA1, RPR, HBCM, HBSAG, HAAB, HCAB1, HAAT, G6PD, CRYAC, HIVGT, HIVR, HIV1, HIV12, HIVPC, HIVRPI    No results found for: VANCT, CPK    Lab Results   Component Value Date/Time    Color YELLOW/STRAW 03/07/2023 10:34 AM    Appearance CLEAR 03/07/2023 10:34 AM    pH (UA) 7.0 03/07/2023 10:34 AM    Protein Negative 03/07/2023 10:34 AM    Glucose Negative 03/07/2023 10:34 AM    Ketone Negative 03/07/2023 10:34 AM    Bilirubin Negative 03/07/2023 10:34 AM    Blood Negative 03/07/2023 10:34 AM    Urobilinogen 0.2 03/07/2023 10:34 AM    Nitrites Negative 03/07/2023 10:34 AM    Leukocyte Esterase Negative 03/07/2023 10:34 AM    WBC 0-4 03/07/2023 10:34 AM    RBC 0-5 03/07/2023 10:34 AM    Bacteria Negative 03/07/2023 10:34 AM       IMPRESSION  Acute respiratory failure with hypoxia    PLAN  Wean O2 for a goal sat of 90% or higher  Will need exercise oximetry prior to discharge  Will check a CTA  OOB as able, IS use      Thank you for allowing us to participate in the care of this patient. We will be happy to follow along in his/her progress with you.     Kunal Richardson MD

## 2023-03-24 NOTE — PROGRESS NOTES
ORTHOPAEDIC LUMBAR FUSION PROGRESS NOTE    NAME:     Niki Soto   :       1955   MRN:       [id number]   DATE:      3/24/2023    POD:    4 Days Post-Op  S/P:    Procedure(s):  L3-L5 LAMINECTOMY/FUSION/INSTRUMENTATION/TLIF/BONE GRAFT    SUBJECTIVE:    Patient sitting up on bedside commode at the time of my exam  Reports +flatus, -BM  C/O back pain along surgical incision mainly with bed mobility  Patient continues to require 2L nasal cannula but remains asymptomatic  Denies any chest pain or shortness of breath  She just quit smoking at the beginning of this month. No previous pulmonary diagnoses  Patient has cleared PT  Hemovac drain with 30cc out over last shift      Recent Labs     23  0630   HGB 10.1*      K 3.8      CO2 31   BUN 11   CREA 0.56   *     Patient Vitals for the past 12 hrs:   BP Temp Pulse Resp SpO2   23 0806 110/65 98.4 °F (36.9 °C) 75 18 94 %   23 0305 105/68 98.1 °F (36.7 °C) 77 18 96 %   23 0000 -- -- -- -- 94 %   23 2343 102/68 98.3 °F (36.8 °C) 76 18 95 %       EXAM:  Alert and oriented, pleasant and appropriately conversant  NAD, speaking in full sentences with no shortness of breath  Positive strength/ROM bilat lower ext. Neuro intact to sensation  Optifoam dressing to lumbar spine is clean, dry and intact   Hemovac drain patent with serosanguinous drainage in tubing    PLAN:  Will consult pulmonology for further recommendations regarding patient's new oxygen requirement. Suspect patient had undiagnosed pulmonary issues prior to surgery. Continue current pain regimen  PT/OT, OOB  Hemovac drain can be removed  Discharge home pending pulmonary recs/setup of home O2/workers comp approval. From an orthopedic standpoint, patient could be discharged home today.       Darlyn Heard NP

## 2023-03-24 NOTE — PROGRESS NOTES
Problem: Self Care Deficits Care Plan (Adult)  Goal: *Acute Goals and Plan of Care (Insert Text)  Description: FUNCTIONAL STATUS PRIOR TO ADMISSION: Patient was independent and active without use of DME.     HOME SUPPORT: The patient lived alone with son and his family to provide assistance as needed (will be staying with pt upon discharge). Occupational Therapy Goals  Initiated 3/21/2023  1. Patient will perform grooming with modified independence within 7 day(s). 2.  Patient will perform upper body dressing with modified independence within 7 day(s). 3.  Patient will perform lower body dressing with modified independence within 7 day(s). 4.  Patient will perform toilet transfers with modified independence within 7 day(s). 5.  Patient will perform all aspects of toileting with modified independence within 7 day(s). Outcome: Progressing Towards Goal   OCCUPATIONAL THERAPY TREATMENT  Patient: Belinda Richardson (14 y.o. female)  Date: 3/24/2023  Diagnosis: Lumbar stenosis [M48.061] <principal problem not specified>  Procedure(s) (LRB):  L3-L5 LAMINECTOMY/FUSION/INSTRUMENTATION/TLIF/BONE GRAFT (N/A) 4 Days Post-Op  Precautions:  (no BLT, log roll technique, sitting 30-45 min, brace when up)  Chart, occupational therapy assessment, plan of care, and goals were reviewed. ASSESSMENT  Patient continues with skilled OT services and is progressing towards goals. Pt ambulated to restroom and transfers to commode stand by assist. Pt able to manage her own clothing and hygiene. She returned to sit in chair and is clear from an OT standpoint. Pt had all DME recommended to her home. Current Level of Function Impacting Discharge (ADLs): stand by assist toileting and hygiene standing at sink    Other factors to consider for discharge:          PLAN :  Patient continues to benefit from skilled intervention to address the above impairments.   Continue treatment per established plan of care to address goals. Recommend with staff: ADL's, there ex, there act    Recommend next OT session: cont towards goals    Recommendation for discharge: (in order for the patient to meet his/her long term goals)  Occupational therapy at least 2 days/week in the home     This discharge recommendation:  Has not yet been discussed the attending provider and/or case management    IF patient discharges home will need the following DME:        SUBJECTIVE:   Patient stated All my equipment has been delivered to my house.     OBJECTIVE DATA SUMMARY:   Cognitive/Behavioral Status:  Neurologic State: Alert  Orientation Level: Oriented X4  Cognition: Follows commands             Functional Mobility and Transfers for ADLs:  Bed Mobility:  Supine to Sit: Stand-by assistance  Sit to Supine: Contact guard assistance    Transfers:  Sit to Stand: Stand-by assistance;Supervision  Functional Transfers  Bathroom Mobility: Stand-by assistance  Toilet Transfer : Stand-by assistance       Balance:  Sitting: Intact  Standing: Intact; With support  Standing - Static: Constant support;Good  Standing - Dynamic : Constant support;Good    ADL Intervention:       Grooming  Grooming Assistance: Stand-by assistance  Position Performed: Standing  Washing Hands: Stand-by assistance         Type of Bath: Chlorhexidine (CHG); Bath Pack         Activity Tolerance:   Good    After treatment patient left in no apparent distress:   Sitting in chair and Call bell within reach    COMMUNICATION/COLLABORATION:   The patients plan of care was discussed with: Occupational therapist and Registered nurse.      JERICA Herndon/L  Time Calculation: 11 mins

## 2023-03-24 NOTE — PROGRESS NOTES
ORTHOPAEDIC LUMBAR FUSION PROGRESS NOTE    NAME:     Bettina Rogers   :       1955   MRN:       [id number]   DATE:      3/24/2023    POD:    4 Days Post-Op  S/P:    Procedure(s):  L3-L5 LAMINECTOMY/FUSION/INSTRUMENTATION/TLIF/BONE GRAFT    SUBJECTIVE:    C/O back pain along surgical incision  No leg pain or numbness  Denies nausea/vomiting, headache, chest pain or shortness of breath  Pain controlled    Recent Labs     23  0630   HGB 10.1*      K 3.8      CO2 31   BUN 11   CREA 0.56   *     Patient Vitals for the past 12 hrs:   BP Temp Pulse Resp SpO2   23 1129 98/62 98.2 °F (36.8 °C) 73 16 96 %   23 0806 110/65 98.4 °F (36.9 °C) 75 18 94 %       EXAM:  Dressings clean, dry and intact   Positive strength/ROM bilat lower ext.   Neuro intact to sensation  Calves, soft & nontender  BL LEs NVID  Hemovac drain has been removed  On O2 NC      PLAN:  Continue prn PO pain medications  Maintain SCDs  PT/OT, OOB w/ assist  Tolerating diet  Pulmonary consulted, rec's pending      Faith Fairbanks, [address]  Orthopaedic Surgery  Physician Assistant to Dr. Ramses Lam

## 2023-03-24 NOTE — PROGRESS NOTES
Problem: Falls - Risk of  Goal: *Absence of Falls  Description: Document Vidal Stephens Fall Risk and appropriate interventions in the flowsheet.   Outcome: Progressing Towards Goal  Note: Fall Risk Interventions:                                Problem: Patient Education: Go to Patient Education Activity  Goal: Patient/Family Education  Outcome: Progressing Towards Goal     Problem: Patient Education: Go to Patient Education Activity  Goal: Patient/Family Education  Outcome: Progressing Towards Goal     Problem: Patient Education: Go to Patient Education Activity  Goal: Patient/Family Education  Outcome: Progressing Towards Goal     Problem: Pain  Goal: *Control of Pain  Outcome: Progressing Towards Goal  Goal: *PALLIATIVE CARE:  Alleviation of Pain  Outcome: Progressing Towards Goal     Problem: Patient Education: Go to Patient Education Activity  Goal: Patient/Family Education  Outcome: Progressing Towards Goal

## 2023-03-24 NOTE — PROGRESS NOTES
Bedside shift change report given to Chriss Go RN (oncoming nurse) by Juanita Frausto RN (offgoing nurse). Report included the following information SBAR, Kardex, Intake/Output, MAR, and Recent Results.

## 2023-03-25 ENCOUNTER — APPOINTMENT (OUTPATIENT)
Dept: GENERAL RADIOLOGY | Age: 68
DRG: 453 | End: 2023-03-25
Attending: PHYSICIAN ASSISTANT
Payer: COMMERCIAL

## 2023-03-25 VITALS
RESPIRATION RATE: 18 BRPM | HEART RATE: 80 BPM | WEIGHT: 158.73 LBS | TEMPERATURE: 98.5 F | SYSTOLIC BLOOD PRESSURE: 113 MMHG | DIASTOLIC BLOOD PRESSURE: 77 MMHG | BODY MASS INDEX: 25.51 KG/M2 | HEIGHT: 66 IN | OXYGEN SATURATION: 93 %

## 2023-03-25 LAB
ANION GAP SERPL CALC-SCNC: 4 MMOL/L (ref 5–15)
BUN SERPL-MCNC: 11 MG/DL (ref 6–20)
BUN/CREAT SERPL: 14 (ref 12–20)
CALCIUM SERPL-MCNC: 8.8 MG/DL (ref 8.5–10.1)
CHLORIDE SERPL-SCNC: 105 MMOL/L (ref 97–108)
CO2 SERPL-SCNC: 27 MMOL/L (ref 21–32)
CREAT SERPL-MCNC: 0.81 MG/DL (ref 0.55–1.02)
GLUCOSE SERPL-MCNC: 141 MG/DL (ref 65–100)
HGB BLD-MCNC: 11 G/DL (ref 11.5–16)
POTASSIUM SERPL-SCNC: 4.1 MMOL/L (ref 3.5–5.1)
SODIUM SERPL-SCNC: 136 MMOL/L (ref 136–145)

## 2023-03-25 PROCEDURE — 74011000250 HC RX REV CODE- 250: Performed by: ORTHOPAEDIC SURGERY

## 2023-03-25 PROCEDURE — 77010033678 HC OXYGEN DAILY

## 2023-03-25 PROCEDURE — 97116 GAIT TRAINING THERAPY: CPT

## 2023-03-25 PROCEDURE — 36415 COLL VENOUS BLD VENIPUNCTURE: CPT

## 2023-03-25 PROCEDURE — 80048 BASIC METABOLIC PNL TOTAL CA: CPT

## 2023-03-25 PROCEDURE — 94761 N-INVAS EAR/PLS OXIMETRY MLT: CPT

## 2023-03-25 PROCEDURE — 74011250637 HC RX REV CODE- 250/637: Performed by: ORTHOPAEDIC SURGERY

## 2023-03-25 PROCEDURE — 74011250636 HC RX REV CODE- 250/636: Performed by: PHYSICIAN ASSISTANT

## 2023-03-25 PROCEDURE — 85018 HEMOGLOBIN: CPT

## 2023-03-25 PROCEDURE — 72100 X-RAY EXAM L-S SPINE 2/3 VWS: CPT

## 2023-03-25 PROCEDURE — 73521 X-RAY EXAM HIPS BI 2 VIEWS: CPT

## 2023-03-25 RX ORDER — DEXAMETHASONE SODIUM PHOSPHATE 4 MG/ML
10 INJECTION, SOLUTION INTRA-ARTICULAR; INTRALESIONAL; INTRAMUSCULAR; INTRAVENOUS; SOFT TISSUE
Status: COMPLETED | OUTPATIENT
Start: 2023-03-25 | End: 2023-03-25

## 2023-03-25 RX ORDER — AMOXICILLIN 250 MG
1 CAPSULE ORAL
Qty: 60 TABLET | Refills: 1 | Status: SHIPPED | OUTPATIENT
Start: 2023-03-25

## 2023-03-25 RX ORDER — CYCLOBENZAPRINE HCL 10 MG
10 TABLET ORAL
Qty: 90 TABLET | Refills: 1 | Status: SHIPPED | OUTPATIENT
Start: 2023-03-25

## 2023-03-25 RX ORDER — NALOXONE HYDROCHLORIDE 4 MG/.1ML
SPRAY NASAL
Qty: 2 EACH | Refills: 5 | Status: SHIPPED | OUTPATIENT
Start: 2023-03-25

## 2023-03-25 RX ORDER — POLYETHYLENE GLYCOL 3350 17 G/17G
17 POWDER, FOR SOLUTION ORAL
Qty: 289 G | Refills: 0 | Status: SHIPPED | OUTPATIENT
Start: 2023-03-25

## 2023-03-25 RX ORDER — HYDROMORPHONE HYDROCHLORIDE 2 MG/1
2-4 TABLET ORAL
Qty: 60 TABLET | Refills: 0 | Status: SHIPPED | OUTPATIENT
Start: 2023-03-25 | End: 2023-04-01

## 2023-03-25 RX ADMIN — CHOLECALCIFEROL TAB 125 MCG (5000 UNIT) 10000 UNITS: 125 TAB at 08:09

## 2023-03-25 RX ADMIN — AMLODIPINE BESYLATE 5 MG: 5 TABLET ORAL at 08:09

## 2023-03-25 RX ADMIN — FAMOTIDINE 20 MG: 20 TABLET, FILM COATED ORAL at 17:00

## 2023-03-25 RX ADMIN — GABAPENTIN 300 MG: 300 CAPSULE ORAL at 08:09

## 2023-03-25 RX ADMIN — SENNOSIDES AND DOCUSATE SODIUM 1 TABLET: 50; 8.6 TABLET ORAL at 08:09

## 2023-03-25 RX ADMIN — GABAPENTIN 300 MG: 300 CAPSULE ORAL at 17:00

## 2023-03-25 RX ADMIN — POLYETHYLENE GLYCOL 3350 17 G: 17 POWDER, FOR SOLUTION ORAL at 08:08

## 2023-03-25 RX ADMIN — SENNOSIDES AND DOCUSATE SODIUM 1 TABLET: 50; 8.6 TABLET ORAL at 17:00

## 2023-03-25 RX ADMIN — HYDROMORPHONE HYDROCHLORIDE 2 MG: 2 TABLET ORAL at 12:50

## 2023-03-25 RX ADMIN — ESCITALOPRAM OXALATE 10 MG: 10 TABLET ORAL at 08:09

## 2023-03-25 RX ADMIN — DEXAMETHASONE SODIUM PHOSPHATE 10 MG: 4 INJECTION, SOLUTION INTRAMUSCULAR; INTRAVENOUS at 15:20

## 2023-03-25 RX ADMIN — HYDROMORPHONE HYDROCHLORIDE 2 MG: 2 TABLET ORAL at 08:08

## 2023-03-25 RX ADMIN — Medication 10 ML: at 15:20

## 2023-03-25 RX ADMIN — HYDROMORPHONE HYDROCHLORIDE 2 MG: 2 TABLET ORAL at 17:00

## 2023-03-25 RX ADMIN — FAMOTIDINE 20 MG: 20 TABLET, FILM COATED ORAL at 08:09

## 2023-03-25 NOTE — PROGRESS NOTES
Spoke with Pulmonary PA Vik  Pt is ok to be discharged from her standpoint if O2 arrangements can be finalized- O2 for transport home, pt has O2 concentrator at home already, home O2 tank to be delivered on Monday    CM in process of finalizing O2 arrangements      EXAM: XR HIPS BI W OR WO AP PELV,     EXAM: XR SPINE LUMB 2 OR 3 V     INDICATION: pain after being moved to [facility] on slide board. COMPARISON: MRI right hip 12/2/2022, MRI lumbar spine 12/2/2022. FINDINGS:     Lumbar spine, 3 views  Alignment is anatomic. Vertebral body and disc space heights maintained. L3-L5  posterior spinal instrumentation and interbody grafts. Cholecystectomy clips. Pelvis, one view; bilateral hips, 1 view each  No acute fracture nor dislocation. IMPRESSION  No acute abnormality of the lumbar spine, pelvis, normal bilateral hips      O2 arrangements have been finalized.  CM and RN will notify pt that she needs to stay home over the weekend until her portable home O2 tank is delivered on Monday    D/C home today w/ home health  D/C Rx's have been escribed to [facility], [facility]  Physician Assistant to Dr. Emerson Johnson

## 2023-03-25 NOTE — PROGRESS NOTES
Problem: Falls - Risk of  Goal: *Absence of Falls  Description: Document Yamel Rosenthal Fall Risk and appropriate interventions in the flowsheet.   Outcome: Progressing Towards Goal  Note: Fall Risk Interventions:                                Problem: Patient Education: Go to Patient Education Activity  Goal: Patient/Family Education  Outcome: Progressing Towards Goal     Problem: Patient Education: Go to Patient Education Activity  Goal: Patient/Family Education  Outcome: Progressing Towards Goal     Problem: Patient Education: Go to Patient Education Activity  Goal: Patient/Family Education  Outcome: Progressing Towards Goal     Problem: Pain  Goal: *Control of Pain  Outcome: Progressing Towards Goal  Goal: *PALLIATIVE CARE:  Alleviation of Pain  Outcome: Progressing Towards Goal     Problem: Patient Education: Go to Patient Education Activity  Goal: Patient/Family Education  Outcome: Progressing Towards Goal

## 2023-03-25 NOTE — DISCHARGE INSTRUCTIONS
Lumbar Spinal Surgery Discharge Instructions   Dr. Lucita Yeager  [phone]    Activity:    You are going home a well person, be as active as possible. Your only exercise should be walking. Start with short frequent walks and increase your walking distance each day. Start with walking twice a day for 5 minutes and increase your distance each day 2-3 minutes until you reach 25 minutes twice a day. Limit the amount of time you sit to 20-30 minute intervals. Sitting for prolonged periods of time will be uncomfortable for you following your surgery. No bending, lifting (of 5lbs or more), twisting, or straining. Do not drive until your doctor states you may do so. However, you may ride in a car for short distances. If you are required to wear a brace, you should wear it at all times when you are out of bed. You may remove it when sleeping unless your physician advises you against it. When you are in the bed, you may lay on your back or on either side. Do not lie on your stomach. You may resume sexual relations 3-4 weeks after surgery depending on how you are feeling. Continue to use your incentive spirometer for deep breathing exercises. Driving: You may not drive or return to work until instructed by your physician. However, you may ride in the car for short periods of time. Brace: If you have a back brace, you should wear your brace at all times when you are out of bed. Do not wear the brace while in bed or showering. Remember to always wear a cotton t-shirt underneath your brace. Do not bend or twist when your brace is off. Diet:  You may resume your regular home diet as tolerated. If your throat is sore, you should eat soft foods for the first couple of days. Be sure to drink plenty of fluids; it is important to keep yourself hydrated. Avoid alcoholic beverages and ABSOLUTELY NO tobacco products. Tobacco products will interfere with your healing.  If you continue to use tobacco, you may end up needing another surgery in the future. Dressing: You have an Optifoam dressing. This is a waterproof bacteriostatic dressing that  requires no post-operative care. It can remain in place for up to 7 days as long as the dressing is intact. Under your Optifoam dressing, your incision is covered with Dermabond surgical skin glue. Please do not peel this off, or apply any oil based products, as they may expedite the deterioration of the glue. The Dermabond will slowly chip off on its own. Do not rub or apply lotion or ointments to incision site. Shower: You may shower 5 days after your surgery. You may remove your brace during showers. NO, Do not use tub baths, swimming pools or Jacuzzis. Medications:  Check with your physician before taking any anti-inflammatory medications. (Advil, Aleve, Ibuprofen, Aspirin)  Take your pain medication as directed  Do NOT take additional Tylenol if your prescribed pain medication has acetaminophen in it (Endocet/Percocet, Lortab, Norco)  It is important to have regular bowel movements. Pain medications can be constipating. Stool softeners, warm prune juice, increasing your water intake, and increasing fiber in your diet can help in preventing constipation. Do NOT take laxatives if at all possible except in severe situations. It can result in a vicious cycle of constipation and diarrhea. Follow-Up   Please call ASAP to schedule your 1st post-operative appointment. This should be approximately 3 weeks from your surgery date. Notify your physician in you develop any of the following conditions:  Fever above 101 degrees for 24 hours. Nausea or vomiting. Severe headache. Inability to urinate  Loss of bowel or bladder function (sudden onset of incontinence)  Changes in sensation in your extremities (numbness, tingling, loss of color). Severe pain or pain not relieved by medications.   Redness, swelling, or drainage from your incision. Persistent pain in the chest.   Pain in the calf of either leg. Increased weakness (if this is greater than before your surgery). If you have any questions about your dressing contact your Orthopaedic Surgeons office. ** IMPORTANT: UNDER YOUR OPTIFOAM DRESSING, YOU HAVE DERMABOND (SURGICAL GLUE) DIRECTLY OVER YOUR INCISION. DO NOT REMOVE THIS. NO ONE ELSE SHOULD REMOVE IT EITHER. IT WILL COME OFF ON ITS OWN OVER TIME    YOUR OPTIFOAM DRESSING SHOULD REMAIN IN PLACE FOR 1 WEEK FROM PLACEMENT. IT IS A WATERPROOF DRESSING AS LONG AS IT'S PLACEMENT IS INTACT. YOU CAN SHOWER AS INDICATED ABOVE IN YOUR DISCHARGE INSTRUCTIONS    * WEAR YOUR BRACE AS ADVISED    * NO DRIVING UNTIL YOU ARE CLEARED TO DO SO BY YOUR SURGEON    * LIMIT LIFTING, BENDING AND TWISTING. NO LIFTING MORE THAN 5 LBS    * NO SWIMMING    * MAKE SURE YOU ARE GETTING GOOD NUTRITION (Lean Protein, Vitamin D AND Calcium)    * DO NOT TAKE ANY NSAIDs FOR THE FIRST 3 MONTHS AFTER SURGERY (such as Advil/Ibuprofen/Motrin, Aleve/Naproxen/Naprosyn, Diclofenac, Celebrex, Meloxicam, Indomethacin, Goody's powder, BC powder etc.)    * NO NICOTINE PRODUCTS       * FULLY READ YOUR DISCHARGE INSTRUCTIONS    FOR PRESCRIPTION REFILLS, DR. Breanna Saini OFFICE NEEDS TO BE CONTACTED 24-48 HOURS PRIOR TO YOU RUNNING OUT OF YOUR MEDICATION(S).  PLEASE KEEP IN MIND THAT PRESCRIPTIONS CANNOT ALWAYS BE REFILLED ON MONDAYS AND TUESDAYS DUE TO OUR OR SCHEDULE    OFFICE OF DR. Ewa Coello     [id number] [place] [address], [facility] [address], [address] [facility], [address] [facility]   DIRECT OFFICE PHONE NUMBER: [phone]  FAX: [phone]

## 2023-03-25 NOTE — PROGRESS NOTES
Problem: Mobility Impaired (Adult and Pediatric)  Goal: *Acute Goals and Plan of Care (Insert Text)  Description: FUNCTIONAL STATUS PRIOR TO ADMISSION: Patient was independent and active without use of DME.    HOME SUPPORT PRIOR TO ADMISSION: The patient lived alone with no local support. Her son and DIL are to stay with her at discharge. Physical Therapy Goals  Initiated 3/21/2023    1. Patient will move from supine to sit and sit to supine  in bed with modified independence within 4 days. 2. Patient will perform sit to stand with modified independence within 4 days. 3. Patient will ambulate with modified independence for 150 feet with the least restrictive device within 4 days. 4. Patient will ascend/descend 6 stairs with 1 handrail(s) with modified independence within 4 days. 5. Patient will verbalize and demonstrate understanding of spinal precautions (No bending, lifting greater than 5 lbs, or twisting; log-roll technique; frequent repositioning as instructed) within 4 days. Note:   PHYSICAL THERAPY TREATMENT  Patient: Ruthie Landau (31 y.o. female)  Date: 3/25/2023  Diagnosis: Lumbar stenosis [M48.061] <principal problem not specified>  Procedure(s) (LRB):  L3-L5 LAMINECTOMY/FUSION/INSTRUMENTATION/TLIF/BONE GRAFT (N/A) 5 Days Post-Op  Precautions: Back No bending, no lifting greater than 5 lbs, no twisting, log-roll technique, repositioning every 20-30 min except when sleeping, brace when OOB (if ordered)  Chart, physical therapy assessment, plan of care and goals were reviewed. ASSESSMENT  Patient continues with skilled PT services and is progressing towards goals. Patient was cleared for discharge to home yesterday from mobility standpoint but due to post op hypoxia and low SPO2 patient remained inpatient. Patient placed on RA for activity today and measures as noted below. She rebounds to 89-93% within a minute of rest. Patient has a concentrator at home for home O2.  She [place] 12 miles from hospital. Spoke with  who states worker's comp has not yet delivered patient's portable O2 for transport to home. [employer] had already delivered a concentrator to patient's home. Spoke with RAEANN Mas as well and  Kyleigh at [facility]. Problem solved ideas with team for d/c to home today. PT suggested loan of a portable tank or payment of medical wheelchair transport with oxygen.  contacting nurse supervisor about medical transport idea. RAEANN Herbert stated if arrangements cannot be made, patient will remain inpt overnight and recheck O2 needs again tomorrow. Documentation for home O2:     ROOM AIR    AT REST   O2 SATS  92 HR     ROOM AIR WITH ACTIVITY  300' amb  asymptomatic 02 SATS  87 HR     ROOM AIR POST ACTIVITY  Within One minute and PLB   O2 SATS  91 HR     ROOM AIR PATIENT LEFT COMFORTABLY  SITTING/SUPINE 02 SATS  93 HR          Current Level of Function Impacting Discharge (mobility/balance): Mod I with '     Other factors to consider for discharge:          PLAN :  Patient continues to benefit from skilled intervention to address the above impairments. Continue treatment per established plan of care. to address goals. Recommendation for discharge: (in order for the patient to meet his/her long term goals)  Per Dr Mitch Waters-     This discharge recommendation:  Has not yet been discussed the attending provider and/or case management    IF patient discharges home will need the following DME: portable oxygen       SUBJECTIVE:   Patient stated .    OBJECTIVE DATA SUMMARY:   Critical Behavior:  Neurologic State: Alert  Orientation Level: Oriented X4  Cognition: Follows commands       Spinal diagnosis intervention:  The patient stated 2/3 back precautions when prompted. Reviewed all 3 back precautions, log roll technique, and sitting for 30 minutes at a time. The patient required min cues to maintain back precautions during functional activity.   Reviewed back brace application and wear schedule. Brace donned with modified independence      Functional Mobility Training:    Bed Mobility:  Log Rolling: Modified independent  Supine to Sit: Modified independent  Sit to Supine: Modified independent  Scooting: Modified independent        Transfers:  Sit to Stand: Modified independent  Stand to Sit: Modified independent  Stand Pivot Transfers: Modified independent                          Balance:  Standing: Intact; With support  Standing - Static: Constant support;Good  Standing - Dynamic : Constant support;Good  Ambulation/Gait Training:  Distance (ft): 300 Feet (ft)  Assistive Device: Gait belt;Walker, rolling;Brace/Splint  Ambulation - Level of Assistance: Supervision;Modified independent        Gait Abnormalities: Decreased step clearance; Antalgic              Speed/Kamilah: Pace decreased (<100 feet/min)          Stairs - Level of Assistance:  (stairs on 3/23- patient feels confident with previous training)    Pain Rating:  3/10    Activity Tolerance:   Good    After treatment patient left in no apparent distress:   Sitting in chair, Call bell within reach, and Caregiver / family present    COMMUNICATION/COLLABORATION:   The patients plan of care was discussed with: Registered nurse.      Keena Alvares PT, DPT   Time Calculation: 26 mins

## 2023-03-25 NOTE — PROGRESS NOTES
3/25/2023  4:42 PM  Case management note    Patient was up for discharge, she was ordered oxygen which was set up at her home. No portable ever arrived at hospital. They said it would be Monday. After much discussion with supervisors, PA, it was decided would go home on wheelchair transport with oxygen provided. It was set up with [facility] home. [id number] spoke with  Aide Domínguez who approved for hospital to pay. Patient and family understand that once patient is home she cant go out until portable arrives on Monday. I spoke with Alessandra Rosenberg from [address] [place] [address] [phone]   tracking $ [phone] EN  US and they will have portable tank delivered to her home on Monday. She is aware of this new plan.     Mele Saeed

## 2023-03-25 NOTE — PROGRESS NOTES
Patient discharged home, PIV removed. VSS. Paperwork reviewed with the patient in detail no questions or concerns. Patient educated on the importance of staying home and using home o2 concentrator. Problem: Falls - Risk of  Goal: *Absence of Falls  Description: Document Amy Treviño Fall Risk and appropriate interventions in the flowsheet.   3/25/2023 1906 by Brenda Cuevas RN  Outcome: Resolved/Met  Note: Fall Risk Interventions:                             3/25/2023 0736 by Brenda Cuevas RN  Outcome: Progressing Towards Goal  Note: Fall Risk Interventions:                                Problem: Patient Education: Go to Patient Education Activity  Goal: Patient/Family Education  3/25/2023 1906 by Brenda Cuevas RN  Outcome: Resolved/Met  3/25/2023 0736 by Brenda Cuevas RN  Outcome: Progressing Towards Goal     Problem: Patient Education: Go to Patient Education Activity  Goal: Patient/Family Education  3/25/2023 1906 by Brenda Cuevas RN  Outcome: Resolved/Met  3/25/2023 0736 by Brenda Cuevas RN  Outcome: Progressing Towards Goal     Problem: Patient Education: Go to Patient Education Activity  Goal: Patient/Family Education  3/25/2023 1906 by Brenda Cuevas RN  Outcome: Resolved/Met  3/25/2023 0736 by Brenda Cuevas RN  Outcome: Progressing Towards Goal     Problem: Pain  Goal: *Control of Pain  3/25/2023 1906 by Brenda Cuevas RN  Outcome: Resolved/Met  3/25/2023 0736 by Brenda Cuevas RN  Outcome: Progressing Towards Goal  Goal: *PALLIATIVE CARE:  Alleviation of Pain  3/25/2023 1906 by Brenda Cuevas RN  Outcome: Resolved/Met  3/25/2023 0736 by Brenda Cuevas RN  Outcome: Progressing Towards Goal     Problem: Patient Education: Go to Patient Education Activity  Goal: Patient/Family Education  3/25/2023 1906 by Brenda Cuevas RN  Outcome: Resolved/Met  3/25/2023 0736 by Brenda Cuevas RN  Outcome: Progressing Towards Goal     Problem: Simple Spine Procedure:  Day of Surgery  Goal: Off Pathway (Use only if patient is Off Pathway)  3/25/2023 1906 by Brenda Cuevas RN  Outcome: Resolved/Met  3/25/2023 0736 by Brenda Cuevas RN  Outcome: Progressing Towards Goal  Goal: Activity/Safety  3/25/2023 1906 by Brenda Cuevas RN  Outcome: Resolved/Met  3/25/2023 0736 by Brenda Cuevas RN  Outcome: Progressing Towards Goal  Goal: Consults, if ordered  3/25/2023 1906 by Brenda Cuevas, SUMEET  Outcome: Resolved/Met  3/25/2023 0736 by Brenda Cuevas RN  Outcome: Progressing Towards Goal  Goal: Nutrition/Diet  3/25/2023 1906 by Brenda Cuevas RN  Outcome: Resolved/Met  3/25/2023 0736 by Brenda Cuevas RN  Outcome: Progressing Towards Goal  Goal: Discharge Planning  3/25/2023 1906 by Brenda Cuevas, SUMEET  Outcome: Resolved/Met  3/25/2023 0736 by Brenda Cuevas RN  Outcome: Progressing Towards Goal  Goal: Medications  3/25/2023 1906 by Brenda Cuevas RN  Outcome: Resolved/Met  3/25/2023 0736 by Brenda Cuevas RN  Outcome: Progressing Towards Goal  Goal: Respiratory  3/25/2023 1906 by Brenda Cuevas RN  Outcome: Resolved/Met  3/25/2023 0736 by Brenda Cuevas RN  Outcome: Progressing Towards Goal  Goal: Treatments/Interventions/Procedures  3/25/2023 1906 by Brenda Cuevas RN  Outcome: Resolved/Met  3/25/2023 0736 by Brenda Cuevas RN  Outcome: Progressing Towards Goal  Goal: Psychosocial  3/25/2023 1906 by Brenda Cuevas, SUMEET  Outcome: Resolved/Met  3/25/2023 0736 by Brenda Cuevas RN  Outcome: Progressing Towards Goal  Goal: Mohini Harrison understanding of type and use of pain medication  3/25/2023 1906 by Brenda Cuevas, SUMEET  Outcome: Resolved/Met  3/25/2023 0736 by Brenda Cuevas RN  Outcome: Progressing Towards Goal  Goal: *Optimal pain control at patient's stated goal  3/25/2023 1906 by Brenda Cuevas, SUMEET  Outcome: Resolved/Met  3/25/2023 0736 by Brenda Cuevas, RN  Outcome: Progressing Towards Goal  Goal: *Verbalizes/demonstrates understanding of proper body mechanics and use of stabilization device if ordered  3/25/2023 1906 by Edna Morse RN  Outcome: Resolved/Met  3/25/2023 0736 by Edna Morse RN  Outcome: Progressing Towards Goal  Goal: *Activity level attained as ordered  3/25/2023 1906 by Edna Morse RN  Outcome: Resolved/Met  3/25/2023 0736 by Edna Morse RN  Outcome: Progressing Towards Goal  Goal: *Active bowel sounds  3/25/2023 1906 by Edna Morse RN  Outcome: Resolved/Met  3/25/2023 0736 by Edna Morse RN  Outcome: Progressing Towards Goal  Goal: *Respiratory status stable  3/25/2023 1906 by Edna Morse RN  Outcome: Resolved/Met  3/25/2023 0736 by Edna Morse RN  Outcome: Progressing Towards Goal  Goal: *Adequate urinary output  3/25/2023 1906 by Edna Morse RN  Outcome: Resolved/Met  3/25/2023 0736 by Edna Morse RN  Outcome: Progressing Towards Goal  Goal: *Hemodynamically stable  3/25/2023 1906 by Edna Morse RN  Outcome: Resolved/Met  3/25/2023 0736 by Edna Morse RN  Outcome: Progressing Towards Goal     Problem: Simple Spine Procedure:  Post Op Day 1/Day of Discharge  Goal: Off Pathway (Use only if patient is Off Pathway)  3/25/2023 1906 by Edna Morse RN  Outcome: Resolved/Met  3/25/2023 0736 by Edna Morse RN  Outcome: Progressing Towards Goal  Goal: Activity/Safety  3/25/2023 1906 by Edna Morse RN  Outcome: Resolved/Met  3/25/2023 0736 by Edna Morse RN  Outcome: Progressing Towards Goal  Goal: Nutrition/Diet  3/25/2023 1906 by Edna Morse RN  Outcome: Resolved/Met  3/25/2023 0736 by Edna Morse RN  Outcome: Progressing Towards Goal  Goal: Discharge Planning  3/25/2023 1906 by Edna Morse RN  Outcome: Resolved/Met  3/25/2023 0736 by Edna Morse RN  Outcome: Progressing Towards Goal  Goal: Medications  3/25/2023 1906 by Brigitte Seals RN  Outcome: Resolved/Met  3/25/2023 0736 by Brigitte Seals RN  Outcome: Progressing Towards Goal  Goal: Respiratory  3/25/2023 1906 by Brigitte Seals RN  Outcome: Resolved/Met  3/25/2023 0736 by Brigitte Seals RN  Outcome: Progressing Towards Goal  Goal: Treatments/Interventions/Procedures  3/25/2023 1906 by Brigitte Seals RN  Outcome: Resolved/Met  3/25/2023 0736 by Brigitte Seals RN  Outcome: Progressing Towards Goal  Goal: Psychosocial  3/25/2023 1906 by Brigitte Seals RN  Outcome: Resolved/Met  3/25/2023 0736 by Brigitte Seals RN  Outcome: Progressing Towards Goal     Problem: Simple Spine Procedure: Discharge Outcomes  Goal: *Optimal pain control at patient's stated goal  3/25/2023 1906 by Brigitte Seals RN  Outcome: Resolved/Met  3/25/2023 0736 by Brigitte Seals RN  Outcome: Progressing Towards Goal  Goal: *Demonstrates ability to place and remove stabilization device  3/25/2023 1906 by Brigitte Seals RN  Outcome: Resolved/Met  3/25/2023 0736 by Brigitte Seals RN  Outcome: Progressing Towards Goal  Goal: *Progress independence mobility/activities (eg: Mobility precautions)  3/25/2023 1906 by Brigitte Seals RN  Outcome: Resolved/Met  3/25/2023 0736 by Brigitte Seals RN  Outcome: Progressing Towards Goal  Goal: *Resumes normal function of bladder and bowel  3/25/2023 1906 by Brigitte Seals RN  Outcome: Resolved/Met  3/25/2023 0736 by Brigitte Seals RN  Outcome: Progressing Towards Goal  Goal: *Lungs clear or at baseline  3/25/2023 1906 by Brigitte Seals RN  Outcome: Resolved/Met  3/25/2023 0736 by Brigitte Seals RN  Outcome: Progressing Towards Goal  Goal: Stephany Red name, dosage, time, side effects, and number of days to continue medications  3/25/2023 1906 by Brigitte Seals RN  Outcome: Resolved/Met  3/25/2023 0736 by Brigitte Seals RN  Outcome: Progressing Towards Goal  Goal: *Modified independence with transfers, ambulation on levels with assistance devices, stair climbing, ADL's  3/25/2023 1906 by Sabrina Watson RN  Outcome: Resolved/Met  3/25/2023 0736 by Sabrina Watson RN  Outcome: Progressing Towards Goal  Goal: *Describes follow-up/return visits to physicians  3/25/2023 1906 by Sabrina Watson RN  Outcome: Resolved/Met  3/25/2023 0736 by Sabrina Watson RN  Outcome: Progressing Towards Goal  Goal: *Describes available resources and support systems  3/25/2023 1906 by Sabrina Watson RN  Outcome: Resolved/Met  3/25/2023 0736 by Sabrina Watson RN  Outcome: Progressing Towards Goal  Goal: *Labs within defined limits  3/25/2023 1906 by Sabrina Watson RN  Outcome: Resolved/Met  3/25/2023 0736 by Sabrina Watson RN  Outcome: Progressing Towards Goal  Goal: *Tolerating diet  3/25/2023 1906 by Sabrina Watson RN  Outcome: Resolved/Met  3/25/2023 0736 by Sabrina Watson RN  Outcome: Progressing Towards Goal

## 2023-03-25 NOTE — PROGRESS NOTES
ORTHOPAEDIC LUMBAR FUSION PROGRESS NOTE    NAME:     Marcela Alfred   :       1955   MRN:       [id number]   DATE:      3/25/2023    POD:    5 Days Post-Op  S/P:    Procedure(s):  L3-L5 LAMINECTOMY/FUSION/INSTRUMENTATION/TLIF/BONE GRAFT    SUBJECTIVE:    C/O back pain along surgical incision, having radiating pain to bilateral hips- pain increased after being jostled/moved to CT scanner on slide board. No fall. No leg pain or numbness  Denies nausea/vomiting, headache, chest pain or shortness of breath    CTA CHEST W OR W WO CONT: Patient Communication     Released  Not seen     Study Result    Narrative & Impression   EXAM:  CTA CHEST W OR W WO CONT     INDICATION: Postop hypoxia     COMPARISON: None. TECHNIQUE: Helical thin section chest CT following uneventful intravenous  administration of nonionic contrast 100 mL of isovue 370 according to  departmental PE protocol. Coronal and sagittal reformats were performed. 3D post  processing was performed. CT dose reduction was achieved through the use of a  standardized protocol tailored for this examination and automatic exposure  control for dose modulation. FINDINGS: This is a good quality study for the evaluation of pulmonary embolism  to the first subsegmental arterial level. There is no pulmonary embolism to this  level. THYROID: Heterogeneous left lobe  MEDIASTINUM: No mass or lymphadenopathy. JONATHAN: No mass or lymphadenopathy. THORACIC AORTA: No aneurysm. HEART: Mild cardiomegaly  ESOPHAGUS: No wall thickening or dilatation. TRACHEA/BRONCHI: Patent. PLEURA: No effusion or pneumothorax. LUNGS: Bilateral lower lobe atelectasis. No definite acute airspace disease  UPPER ABDOMEN: Partially imaged. No acute pathology. 3 cm cyst in the right lobe  of the liver. BONES: No aggressive bone lesion or fracture. IMPRESSION  1. No evidence of pulmonary embolism. 2. Bilateral lower lobe atelectasis. 3. No evidence of pneumonia. Recent Labs     03/24/23  0630   HGB 10.1*      K 3.8      CO2 31   BUN 11   CREA 0.56   *     Patient Vitals for the past 12 hrs:   BP Temp Pulse Resp SpO2   03/25/23 1255 110/68 98.1 °F (36.7 °C) 72 18 97 %   03/25/23 1143 118/60 98.2 °F (36.8 °C) 72 18 96 %   03/25/23 0808 106/69 98.3 °F (36.8 °C) 77 18 93 %   03/25/23 0354 111/68 98.4 °F (36.9 °C) 73 16 92 %       EXAM:  Dressings clean, dry and intact. No bruising, swelling or ecchymosis  Bilateral hips NTTP. No pain with rotational movement of the joints. Benign exam.   Positive strength/ROM bilat lower ext.   Neuro intact to sensation  Calves, soft & nontender  BL LEs NVID    Hemovac has been removed  On O2 NC    PLAN:  Lumbar and bilat hip XRs   IV decadron  Continue prn PO pain medications  Maintain SCDs  PT/OT, OOB w/ assist  Tolerating diet  Appreciate Pulmonary rec's  CM is still finalizing arrangements for Home O2  Encouraged use of IS      Jose Alberto [place]  Orthopaedic Surgery  Physician Assistant to Dr. Josh Krishnamurthy

## 2023-03-25 NOTE — PROGRESS NOTES
Name: [address] [place] [address] Street: [facility]   : 1955 Admit Date: 3/20/2023   Phone: [phone]  Room: [facility]   PCP: Cheng Steele MD  MRN: [id number]   Date: 3/25/2023  Code: No Order          Chart and notes reviewed. Data reviewed. I review the patient's current medications in the medical record at each encounter. I have evaluated and examined the patient. HPI:    4:23 PM       History was obtained from patient. I was asked by Nader Wheat MD to see Jhonathan Eldridge in consultation for a chief complaint of hypoxia. History of Present Illness:  Ms. Abby Gaviria is a 75 yo woman with a history of HTN and spinal stenosis who is admitted for lumbar laminectomy who has had post-op hypoxia. She denies shortness of breath, cough, wheezing, chest pain/tightness or edema. No history of prior hypoxia or underlying lung disease. Labs: WBC 10.1, cr 0.56,     Images reviewed:  CXR 3/22/2023: lungs clear, no acute or cardiopulmonary process  CTA 3/23/2023: No pulmonary embolism. Bibasilar atelectasis. No edema or pneumonia. Interval Hx:  Pt awake and working on IS (1250cc). Denies chest pain or dyspnea or cough. Pt reports oxygen equipment is being delivered to her home. She is on 1 lpm O2, sats are in the mid 90s.       Past Medical History:   Diagnosis Date    Anxiety and depression     Hypertension     Migraines     Nausea & vomiting     PONV       Past Surgical History:   Procedure Laterality Date    HX COLONOSCOPY  2019    HX HYSTERECTOMY      ovaries intact    HX LAP CHOLECYSTECTOMY      HX WISDOM TEETH EXTRACTION         Family History   Problem Relation Age of Onset    Cancer Mother         cervical cancer    Hypertension Mother     Diabetes Father     Hypertension Father     Heart Disease Father     Arrhythmia Father         pacemaker    Hypertension Sister     Heart Disease Sister     Asthma Sister     Hypertension Sister     Heart Disease Brother         MI at age 54    No Known Problems Brother     No Known Problems Brother        Social History     Tobacco Use    Smoking status: Former     Packs/day: 0.50     Years: 30.00     Pack years: 15.00     Types: Cigarettes     Quit date: 3/9/2023     Years since quittin.0    Smokeless tobacco: Never   Substance Use Topics    Alcohol use: Yes     Comment: 5-6 drinks in a year       Allergies   Allergen Reactions    Percocet [Oxycodone-Acetaminophen] Itching       Current Facility-Administered Medications   Medication Dose Route Frequency    prochlorperazine (COMPAZINE) injection 5 mg  5 mg IntraVENous Q4H PRN    gabapentin (NEURONTIN) capsule 300 mg  300 mg Oral BID    amLODIPine (NORVASC) tablet 5 mg  5 mg Oral DAILY    escitalopram oxalate (LEXAPRO) tablet 10 mg  10 mg Oral DAILY    cholecalciferol (VITAMIN D3) (5000 Units/125 mcg) tablet 10,000 Units  10,000 Units Oral DAILY    sodium chloride (NS) flush 5-40 mL  5-40 mL IntraVENous Q8H    sodium chloride (NS) flush 5-40 mL  5-40 mL IntraVENous PRN    naloxone (NARCAN) injection 0.4 mg  0.4 mg IntraVENous PRN    senna-docusate (PERICOLACE) 8.6-50 mg per tablet 1 Tablet  1 Tablet Oral BID    polyethylene glycol (MIRALAX) packet 17 g  17 g Oral DAILY    bisacodyL (DULCOLAX) suppository 10 mg  10 mg Rectal DAILY PRN    acetaminophen (TYLENOL) tablet 650 mg  650 mg Oral Q6H PRN    HYDROmorphone (DILAUDID) tablet 2 mg  2 mg Oral Q4H PRN    HYDROmorphone (DILAUDID) tablet 4 mg  4 mg Oral Q4H PRN    famotidine (PEPCID) tablet 20 mg  20 mg Oral BID    diphenhydrAMINE (BENADRYL) injection 12.5 mg  12.5 mg IntraVENous Q6H PRN    cyclobenzaprine (FLEXERIL) tablet 10 mg  10 mg Oral TID PRN         REVIEW OF SYSTEMS   12 point ROS negative except as stated in the HPI.       Physical Exam:   Visit Vitals  /69 (BP 1 Location: Left upper arm, BP Patient Position: At rest)   Pulse 77   Temp 98.3 °F (36.8 °C)   Resp 18   Ht 5' 6\" (1.676 m)   Wt 72 kg (158 lb 11.7 oz)   SpO2 93%   BMI 25.62 kg/m²       General:  Alert, cooperative, no distress, appears stated age. Head:  Normocephalic, without obvious abnormality, atraumatic. Eyes:  Conjunctivae/corneas clear. Nose: Nares normal. Septum midline. Throat: Lips, mucosa, and tongue normal.    Neck: Supple, symmetrical, trachea midline   Lungs:   Clear to auscultation bilaterally. Chest wall:  No tenderness or deformity. Heart:  Regular rate and rhythm, S1, S2 normal, no murmur, click, rub or gallop. Abdomen:   Soft, non-tender. Bowel sounds normal.    Extremities: Extremities normal, atraumatic, no cyanosis or edema. Pulses: 2+ and symmetric all extremities. Skin: Skin color, texture, turgor normal. No rashes or lesions       Neurologic: Grossly nonfocal       Lab Results   Component Value Date/Time    Sodium 138 03/24/2023 06:30 AM    Potassium 3.8 03/24/2023 06:30 AM    Chloride 105 03/24/2023 06:30 AM    CO2 31 03/24/2023 06:30 AM    BUN 11 03/24/2023 06:30 AM    Creatinine 0.56 03/24/2023 06:30 AM    Glucose 105 (H) 03/24/2023 06:30 AM    Calcium 8.4 (L) 03/24/2023 06:30 AM       Lab Results   Component Value Date/Time    WBC 5.0 03/07/2023 10:34 AM    HGB 10.1 (L) 03/24/2023 06:30 AM    PLATELET 720 64/02/9053 10:34 AM    .1 (H) 03/07/2023 10:34 AM       Lab Results   Component Value Date/Time    INR 1.0 03/07/2023 10:34 AM    aPTT 25.6 03/07/2023 10:34 AM    Alk.  phosphatase 92 03/07/2023 10:34 AM    Protein, total 7.3 03/07/2023 10:34 AM    Albumin 4.0 03/07/2023 10:34 AM    Globulin 3.3 03/07/2023 10:34 AM       No results found for: IRON, FE, TIBC, IBCT, PSAT, FERR    No results found for: SR, CRP, NADER, ANAIGG, RA, RPR, RPRT, VDRLT, VDRLS, TSH, TSHEXT, TSHEXT     No results found for: PH, PHI, PCO2, PCO2I, PO2, PO2I, HCO3, HCO3I, FIO2, FIO2I    No results found for: CPK, RCK1, RCK2, RCK3, RCK4, CKNDX, CKND1, TROPT, TROIQ, BNPP, BNP     Lab Results   Component Value Date/Time    Culture result: MRSA NOT PRESENT 03/07/2023 10:34 AM    Culture result:  03/07/2023 10:34 AM     Screening of patient nares for MRSA is for surveillance purposes and, if positive, to facilitate isolation considerations in high risk settings. It is not intended for automatic decolonization interventions per se as regimens are not sufficiently effective to warrant routine use. No results found for: TOXA1, RPR, HBCM, HBSAG, HAAB, HCAB1, HAAT, G6PD, CRYAC, HIVGT, HIVR, HIV1, HIV12, HIVPC, HIVRPI    No results found for: VANCT, CPK    Lab Results   Component Value Date/Time    Color YELLOW/STRAW 03/07/2023 10:34 AM    Appearance CLEAR 03/07/2023 10:34 AM    pH (UA) 7.0 03/07/2023 10:34 AM    Protein Negative 03/07/2023 10:34 AM    Glucose Negative 03/07/2023 10:34 AM    Ketone Negative 03/07/2023 10:34 AM    Bilirubin Negative 03/07/2023 10:34 AM    Blood Negative 03/07/2023 10:34 AM    Urobilinogen 0.2 03/07/2023 10:34 AM    Nitrites Negative 03/07/2023 10:34 AM    Leukocyte Esterase Negative 03/07/2023 10:34 AM    WBC 0-4 03/07/2023 10:34 AM    RBC 0-5 03/07/2023 10:34 AM    Bacteria Negative 03/07/2023 10:34 AM       IMPRESSION  POD 5 L3-L5 laminectomy and fusion  Acute respiratory failure with hypoxia - weaning O2 for SpO2 >90%  Basilar atelectasis. CTA negative for emboli or edema. PLAN  Wean O2 for a goal sat of 90% or higher  Will need exercise oximetry prior to discharge  OOB as able, IS use    Will be available as needed Sunday.          RAEANN Gillis

## 2023-03-27 NOTE — PROGRESS NOTES
3/27/2023  11:16 AM  Chart accessed as TREVOR notified that pt's O2 was delivered to hospital to day rather than pt's home. TREVOR called and spoke with Teagan Kwok, pt's ANDRES, and she reported family will come pickup O2 from 4th floor nurses station today.

## 2023-03-31 NOTE — PROGRESS NOTES
Physician Progress Note      PATIENT:               Temitope Espinosa  [facility] #:                  [id number]  :                       1955  ADMIT DATE:       3/20/2023 10:14 AM  DISCH DATE:        3/25/2023 7:51 PM  RESPONDING  PROVIDER #:        Wilson CARY          QUERY TEXT:    Good Morning    This patient admitted on 2023-2023 for planned Spinal Fusion. The medical record also notes a diagnosis of \"Acute hypoxic Respiratory Failure\"  There is also mention of \"Post op hypoxia\". The clinical indicators documented in the medical record supports hypoxia, however,  In order to support the diagnosis of acute respiratory failure, please include additional clinical indicators in your documentation. Or please document if the diagnosis of acute respiratory failure has been ruled out after further study. The medical record reflects the following:  Risk Factors: S/p Spinal fusion, HTN  Clinical Indicators: Post op Pulmonary consulted, they note, \"chief compliant of hypoxia\". She denies SOB, cough wheezing, Chest pain. No prior history of hypoxia or underlying lung disease. CXR clear lungs, CTA  with No PE, Bibasilar atelectasis. No Pneumonia.  Resp rate was documented 16-18 and o2 sats x1 noted 89% on RA briefly and then on 2 liters maintained 93-95%  Treatment: Pulm consulted, CTA, CXR, o2 supplement (on 1 liter @ discharge )-    Thank you  Rosa Maria Case, BSN,RN, CPHQ, CCDS, SMART  ________________________________________________________    Acute Respiratory Failure Clinical Indicators per [employer] Training Guide and Quick Reference Guide:  pO2 < 60 mmHg  pCO2 > 50 and pH < 7.35  P/F ratio (pO2 / FIO2) < 300  pO2 decrease or pCO2 increase by 10 mmHg from baseline (if known)  Supplemental oxygen of 40% or more  Presence of respiratory distress, tachypnea, dyspnea, shortness of breath, wheezing  Unable to speak in complete sentences  Use of accessory muscles to breathe  Extreme anxiety and feeling of impending doom  Tripod position  Confusion/altered mental status/obtunded  Options provided:  -- Acute Respiratory Failure as evidenced by, Please document evidence. -- Acute Respiratory Failure ruled out after study.  The patient had acute hypoxia only  -- Other - I will add my own diagnosis  -- Disagree - Not applicable / Not valid  -- Disagree - Clinically unable to determine / Unknown  -- Refer to Clinical Documentation Reviewer    PROVIDER RESPONSE TEXT:    Acute respiratory failure with hypoxia, Basilar atelectasis  Please refer to Documentation by Pulmonary Specialist(s)    Query created by: Claudia Bolaños on 3/31/2023 7:13 AM      Electronically signed by:  Juancarlos CARY 3/31/2023 1:14 PM

## 2023-04-13 ENCOUNTER — HOSPITAL ENCOUNTER (OUTPATIENT)
Dept: VASCULAR SURGERY | Age: 68
Discharge: HOME | End: 2023-04-13
Attending: PHYSICIAN ASSISTANT
Payer: COMMERCIAL

## 2023-04-13 DIAGNOSIS — Z98.1 STATUS POST LUMBAR SPINAL FUSION: ICD-10-CM

## 2023-04-13 PROCEDURE — 93971 EXTREMITY STUDY: CPT

## 2023-06-23 ENCOUNTER — TRANSCRIBE ORDERS (OUTPATIENT)
Facility: HOSPITAL | Age: 68
End: 2023-06-23

## 2023-06-23 DIAGNOSIS — Z98.1 ARTHRODESIS STATUS: Primary | ICD-10-CM

## 2023-06-23 DIAGNOSIS — M54.16 LUMBAR RADICULOPATHY: ICD-10-CM

## 2023-07-12 ENCOUNTER — HOSPITAL ENCOUNTER (OUTPATIENT)
Age: 68
Discharge: HOME | End: 2023-07-15

## 2023-07-12 DIAGNOSIS — Z98.1 ARTHRODESIS STATUS: ICD-10-CM

## 2023-07-12 DIAGNOSIS — M54.16 LUMBAR RADICULOPATHY: ICD-10-CM

## 2023-07-12 PROCEDURE — 72158 MRI LUMBAR SPINE W/O & W/DYE: CPT

## 2023-07-12 PROCEDURE — A9579 GAD-BASE MR CONTRAST NOS,1ML: HCPCS | Performed by: RADIOLOGY

## 2023-07-12 PROCEDURE — 6360000004 HC RX CONTRAST MEDICATION: Performed by: RADIOLOGY

## 2023-07-12 RX ADMIN — GADOTERIDOL 15 ML: 279.3 INJECTION, SOLUTION INTRAVENOUS at 12:00

## 2024-03-07 ENCOUNTER — HOSPITAL ENCOUNTER (OUTPATIENT)
Facility: HOSPITAL | Age: 69
Setting detail: OUTPATIENT SURGERY
Discharge: HOME | End: 2024-03-07
Attending: INTERNAL MEDICINE | Admitting: INTERNAL MEDICINE
Payer: MEDICARE

## 2024-03-07 ENCOUNTER — ANESTHESIA EVENT (OUTPATIENT)
Facility: HOSPITAL | Age: 69
End: 2024-03-07
Payer: MEDICARE

## 2024-03-07 ENCOUNTER — ANESTHESIA (OUTPATIENT)
Facility: HOSPITAL | Age: 69
End: 2024-03-07
Payer: MEDICARE

## 2024-03-07 VITALS
WEIGHT: 160 LBS | OXYGEN SATURATION: 96 % | BODY MASS INDEX: 25.71 KG/M2 | SYSTOLIC BLOOD PRESSURE: 135 MMHG | HEART RATE: 79 BPM | TEMPERATURE: 97.8 F | DIASTOLIC BLOOD PRESSURE: 85 MMHG | HEIGHT: 66 IN | RESPIRATION RATE: 24 BRPM

## 2024-03-07 PROCEDURE — 2580000003 HC RX 258: Performed by: INTERNAL MEDICINE

## 2024-03-07 PROCEDURE — 3600007502: Performed by: INTERNAL MEDICINE

## 2024-03-07 PROCEDURE — 7100000011 HC PHASE II RECOVERY - ADDTL 15 MIN: Performed by: INTERNAL MEDICINE

## 2024-03-07 PROCEDURE — 3700000000 HC ANESTHESIA ATTENDED CARE: Performed by: INTERNAL MEDICINE

## 2024-03-07 PROCEDURE — 6360000002 HC RX W HCPCS: Performed by: NURSE ANESTHETIST, CERTIFIED REGISTERED

## 2024-03-07 PROCEDURE — C1889 IMPLANT/INSERT DEVICE, NOC: HCPCS | Performed by: INTERNAL MEDICINE

## 2024-03-07 PROCEDURE — 7100000010 HC PHASE II RECOVERY - FIRST 15 MIN: Performed by: INTERNAL MEDICINE

## 2024-03-07 PROCEDURE — 3600007512: Performed by: INTERNAL MEDICINE

## 2024-03-07 PROCEDURE — 88305 TISSUE EXAM BY PATHOLOGIST: CPT

## 2024-03-07 PROCEDURE — 2709999900 HC NON-CHARGEABLE SUPPLY: Performed by: INTERNAL MEDICINE

## 2024-03-07 PROCEDURE — 3700000001 HC ADD 15 MINUTES (ANESTHESIA): Performed by: INTERNAL MEDICINE

## 2024-03-07 PROCEDURE — 2500000003 HC RX 250 WO HCPCS: Performed by: NURSE ANESTHETIST, CERTIFIED REGISTERED

## 2024-03-07 DEVICE — WORKING LENGTH 235CM, WORKING CHANNEL 2.8MM
Type: IMPLANTABLE DEVICE | Site: ASCENDING COLON | Status: FUNCTIONAL
Brand: RESOLUTION 360 CLIP

## 2024-03-07 RX ORDER — IBUPROFEN 600 MG/1
600 TABLET ORAL EVERY 6 HOURS PRN
Status: ON HOLD | COMMUNITY
End: 2024-03-07 | Stop reason: HOSPADM

## 2024-03-07 RX ORDER — ONDANSETRON 2 MG/ML
INJECTION INTRAMUSCULAR; INTRAVENOUS PRN
Status: DISCONTINUED | OUTPATIENT
Start: 2024-03-07 | End: 2024-03-07 | Stop reason: SDUPTHER

## 2024-03-07 RX ORDER — SODIUM CHLORIDE 0.9 % (FLUSH) 0.9 %
5-40 SYRINGE (ML) INJECTION EVERY 12 HOURS SCHEDULED
Status: DISCONTINUED | OUTPATIENT
Start: 2024-03-07 | End: 2024-03-07 | Stop reason: HOSPADM

## 2024-03-07 RX ORDER — SODIUM CHLORIDE 0.9 % (FLUSH) 0.9 %
5-40 SYRINGE (ML) INJECTION PRN
Status: DISCONTINUED | OUTPATIENT
Start: 2024-03-07 | End: 2024-03-07 | Stop reason: HOSPADM

## 2024-03-07 RX ORDER — SODIUM CHLORIDE 9 MG/ML
INJECTION, SOLUTION INTRAVENOUS CONTINUOUS
Status: DISCONTINUED | OUTPATIENT
Start: 2024-03-07 | End: 2024-03-07 | Stop reason: HOSPADM

## 2024-03-07 RX ORDER — LIDOCAINE HYDROCHLORIDE 20 MG/ML
INJECTION, SOLUTION EPIDURAL; INFILTRATION; INTRACAUDAL; PERINEURAL PRN
Status: DISCONTINUED | OUTPATIENT
Start: 2024-03-07 | End: 2024-03-07 | Stop reason: SDUPTHER

## 2024-03-07 RX ORDER — SODIUM CHLORIDE 9 MG/ML
25 INJECTION, SOLUTION INTRAVENOUS PRN
Status: DISCONTINUED | OUTPATIENT
Start: 2024-03-07 | End: 2024-03-07 | Stop reason: HOSPADM

## 2024-03-07 RX ADMIN — LIDOCAINE HYDROCHLORIDE 50 MG: 20 INJECTION, SOLUTION EPIDURAL; INFILTRATION; INTRACAUDAL; PERINEURAL at 11:52

## 2024-03-07 RX ADMIN — PROPOFOL 50 MG: 10 INJECTION, EMULSION INTRAVENOUS at 11:57

## 2024-03-07 RX ADMIN — PROPOFOL 80 MG: 10 INJECTION, EMULSION INTRAVENOUS at 11:52

## 2024-03-07 RX ADMIN — SODIUM CHLORIDE: 9 INJECTION, SOLUTION INTRAVENOUS at 11:50

## 2024-03-07 RX ADMIN — PROPOFOL 20 MG: 10 INJECTION, EMULSION INTRAVENOUS at 11:54

## 2024-03-07 RX ADMIN — PROPOFOL 50 MG: 10 INJECTION, EMULSION INTRAVENOUS at 12:00

## 2024-03-07 RX ADMIN — ONDANSETRON HYDROCHLORIDE 4 MG: 2 INJECTION, SOLUTION INTRAMUSCULAR; INTRAVENOUS at 11:50

## 2024-03-07 RX ADMIN — PROPOFOL 20 MG: 10 INJECTION, EMULSION INTRAVENOUS at 12:03

## 2024-03-07 ASSESSMENT — PAIN SCALES - GENERAL
PAINLEVEL_OUTOF10: 0
PAINLEVEL_OUTOF10: 0

## 2024-03-07 ASSESSMENT — PAIN - FUNCTIONAL ASSESSMENT: PAIN_FUNCTIONAL_ASSESSMENT: 0-10

## 2024-03-07 NOTE — H&P
negative dyspnea  Cards:  negative for chest pain, palpitations, lower extremity edema  GI:   See HPI  :  negative for frequency, dysuria  Integument:  negative for rash and pruritus  Heme:  negative for easy bruising and gum/nose bleeding  Musculoskel: negative for myalgias,  back pain and muscle weakness  Neuro: negative for headaches, dizziness, vertigo  Psych:  negative for feelings of anxiety, depression       Objective:   Patient Vitals for the past 8 hrs:   BP Temp Temp src Pulse Resp SpO2 Height Weight   03/07/24 1120 (!) 131/90 98.9 °F (37.2 °C) Skin 86 16 96 % 1.676 m (5' 6\") 72.6 kg (160 lb)     No intake/output data recorded.  No intake/output data recorded.    EXAM:     NEURO-a&o   HEENT-wnl   LUNGS-clear    COR-regular rate and rhythym     ABD-soft , no tenderness, no rebound, good bs     EXT-no edema     Data Review     No results for input(s): \"WBC\", \"HGB\", \"HCT\", \"PLT\" in the last 72 hours.  No results for input(s): \"NA\", \"K\", \"CL\", \"CO2\", \"BUN\", \"CREA\", \"GLU\", \"PHOS\", \"CA\" in the last 72 hours.  No results for input(s): \"TP\", \"ALB\", \"GLOB\", \"GGT\", \"AML\" in the last 72 hours.    Invalid input(s): \"SGOT\", \"GPT\", \"AP\", \"TBIL\", \"AMYP\", \"LPSE\", \"HLPSE\"  No results for input(s): \"INR\", \"APTT\" in the last 72 hours.    Invalid input(s): \"PTP\"       Assessment:     H/o colon polyps      Patient Active Problem List   Diagnosis    Lumbar stenosis       Plan:     Endoscopic procedure with sedation     Signed By: Lonny Greenberg MD     3/7/2024  11:48 AM

## 2024-03-07 NOTE — ANESTHESIA PRE PROCEDURE
Department of Anesthesiology  Preprocedure Note       Name:  Tatiana Corcoran   Age:  68 y.o.  :  1955                                          MRN:  [id number]         Date:  3/7/2024      Surgeon: Surgeon(s):  Lonny Greenberg MD    Procedure: Procedure(s):  COLONOSCOPY    Medications prior to admission:   Prior to Admission medications    Medication Sig Start Date End Date Taking? Authorizing Provider   ibuprofen (ADVIL;MOTRIN) 600 MG tablet Take 1 tablet by mouth every 6 hours as needed for Pain   Yes Provider, Ml, MD   amLODIPine (NORVASC) 5 MG tablet Take 1 tablet by mouth daily    Automatic Reconciliation, Ar   ACETAMINOPHEN-BUTALBITAL  MG TABS Take 1 tablet by mouth as needed    Automatic Reconciliation, Ar   vitamin D (CHOLECALCIFEROL) 25 MCG (1000 UT) TABS tablet Take 1 tablet by mouth daily    Automatic Reconciliation, Ar   vitamin D3 (CHOLECALCIFEROL) 125 MCG (5000 UT) TABS tablet Take 2 tablets by mouth daily 3/7/23 4/6/23  Automatic Reconciliation, Ar   cyclobenzaprine (FLEXERIL) 10 MG tablet Take 1 tablet by mouth 3 times daily as needed 3/25/23   Automatic Reconciliation, Ar   escitalopram (LEXAPRO) 10 MG tablet Take 1 tablet by mouth daily    Automatic Reconciliation, Ar   gabapentin (NEURONTIN) 300 MG capsule Take 1 capsule by mouth 2 times daily.    Automatic Reconciliation, Ar   naloxone 4 MG/0.1ML LIQD nasal spray Apply 1 nasal spray to one nostril; may repeat every 2-3 minutes in alternating nostrils until medical assistance arrives 3/25/23   Automatic Reconciliation, Ar   polyethylene glycol (GLYCOLAX) 17 GM/SCOOP powder Take 17 g by mouth daily as needed 3/25/23   Automatic Reconciliation, Ar   senna-docusate (PERICOLACE) 8.6-50 MG per tablet Take 1 tablet by mouth 2 times daily as needed 3/25/23   Automatic Reconciliation, Ar       Current medications:    Current Facility-Administered Medications   Medication Dose Route Frequency Provider Last Rate Last Admin   • 0.9 %

## 2024-03-07 NOTE — ANESTHESIA POSTPROCEDURE EVALUATION
Department of Anesthesiology  Postprocedure Note    Patient: Tatiana Corcoran  MRN: [id number]  YOB: 1955  Date of evaluation: 3/7/2024    Procedure Summary       Date: 03/07/24 Room / Location: [facility] ENDO 06 / [facility] ENDOSCOPY    Anesthesia Start: 1150 Anesthesia Stop: 1207    Procedure: COLONOSCOPY (Lower GI Region) Diagnosis:       Personal history of colonic polyps      (Personal history of colonic polyps [Z86.010])    Surgeons: Lonny Greenberg MD Responsible Provider: Cricket James MD    Anesthesia Type: MAC ASA Status: 2            Anesthesia Type: No value filed.    Jonas Phase I: Jonas Score: 10    Jonas Phase II: Jonas Score: 9    Anesthesia Post Evaluation    Patient location during evaluation: bedside  Patient participation: complete - patient participated  Level of consciousness: awake  Airway patency: patent  Nausea & Vomiting: no nausea  Cardiovascular status: blood pressure returned to baseline  Respiratory status: acceptable  Hydration status: euvolemic  Pain management: adequate    There were no known notable events for this encounter.

## 2024-03-07 NOTE — ANESTHESIA PRE PROCEDURE
Department of Anesthesiology  Preprocedure Note       Name:  Tatiana Corcoran   Age:  68 y.o.  :  1955                                          MRN:  [id number]         Date:  3/7/2024      Surgeon: Surgeon(s):  Lonny Greenberg MD    Procedure: Procedure(s):  COLONOSCOPY    Medications prior to admission:   Prior to Admission medications    Medication Sig Start Date End Date Taking? Authorizing Provider   ibuprofen (ADVIL;MOTRIN) 600 MG tablet Take 1 tablet by mouth every 6 hours as needed for Pain   Yes Provider, Ml, MD   amLODIPine (NORVASC) 5 MG tablet Take 1 tablet by mouth daily    Automatic Reconciliation, Ar   ACETAMINOPHEN-BUTALBITAL  MG TABS Take 1 tablet by mouth as needed    Automatic Reconciliation, Ar   vitamin D (CHOLECALCIFEROL) 25 MCG (1000 UT) TABS tablet Take 1 tablet by mouth daily    Automatic Reconciliation, Ar   vitamin D3 (CHOLECALCIFEROL) 125 MCG (5000 UT) TABS tablet Take 2 tablets by mouth daily 3/7/23 4/6/23  Automatic Reconciliation, Ar   cyclobenzaprine (FLEXERIL) 10 MG tablet Take 1 tablet by mouth 3 times daily as needed 3/25/23   Automatic Reconciliation, Ar   escitalopram (LEXAPRO) 10 MG tablet Take 1 tablet by mouth daily    Automatic Reconciliation, Ar   gabapentin (NEURONTIN) 300 MG capsule Take 1 capsule by mouth 2 times daily.    Automatic Reconciliation, Ar   naloxone 4 MG/0.1ML LIQD nasal spray Apply 1 nasal spray to one nostril; may repeat every 2-3 minutes in alternating nostrils until medical assistance arrives 3/25/23   Automatic Reconciliation, Ar   polyethylene glycol (GLYCOLAX) 17 GM/SCOOP powder Take 17 g by mouth daily as needed 3/25/23   Automatic Reconciliation, Ar   senna-docusate (PERICOLACE) 8.6-50 MG per tablet Take 1 tablet by mouth 2 times daily as needed 3/25/23   Automatic Reconciliation, Ar       Current medications:    Current Facility-Administered Medications   Medication Dose Route Frequency Provider Last Rate Last Admin   • 0.9 %

## 2024-03-07 NOTE — OP NOTE
[facility] [place] [id number]      Colonoscopy Operative Report    Tatiana Corcoran  [id number]  1955      Procedure Type:   Colonoscopy with polypectomy (snare cautery)     Indications:    Personal history of colon polyps (screening only)       Pre-operative Diagnosis: see indication above    Post-operative Diagnosis:  See findings below    :  Lonny Greenberg MD    Surgical Assistant: Circulator: Aaron Le RN  Endoscopy Technician: Aj Mendez    Implants:  None    Referring Provider: Onelia Olvera MD      Sedation:  MAC anesthesia Propofol      Procedure Details:  After informed consent was obtained with all risks and benefits of procedure explained and preoperative exam completed, the patient was taken to the endoscopy suite and placed in the left lateral decubitus position.  Upon sequential sedation as per above, a digital rectal exam was performed demonstrating internal hemorrhoids.  The Olympus videocolonoscope  was inserted in the rectum and carefully advanced to the cecum, which was identified by the ileocecal valve and appendiceal orifice.  The cecum was identified by the ileocecal valve and appendiceal orifice.  The quality of preparation was good.  The colonoscope was slowly withdrawn with careful evaluation between folds. Retroflexion in the rectum was completed .     Findings:   Rectum: normal  Sigmoid: mild diverticulosis;  Descending Colon: normal  Transverse Colon: normal  Ascending Colon: 2 cm sessile polyp, removed by hot snaring, 2 hemoclips placed at site to prevent any bleeding  Cecum: normal    Specimen Removed:   as above     Complications: None.     EBL:  None.    Impression:     see findings     Recommendations: --Await pathology.      Recommendation for next colonscopy in 3 years  High fiber diet  Avoid NSAIDS for next 3 days     Signed By: Lonny Greenberg MD     3/7/2024  12:08 PM

## 2024-03-07 NOTE — DISCHARGE INSTRUCTIONS
[facility]  [address] [place] [id number]    COLON DISCHARGE INSTRUCTIONS    Tatiana Corcoran  [id number]  1955    Discomfort:  Redness at IV site- apply warm compress to area; if redness or soreness persist- contact your physician  There may be a slight amount of blood passed from the rectum  Gaseous discomfort- walking, belching will help relieve any discomfort  You may not operate a vehicle for 12 hours  You may not engage in an occupation involving machinery or appliances for rest of today  You may not drink alcoholic beverages for at least 12 hours  Avoid making any critical decisions for at least 24 hour  DIET:  You may resume your regular diet - however -  remember your colon is empty and a heavy meal will produce gas.  Avoid these foods:  vegetables, fried / greasy foods, carbonated drinks     ACTIVITY:  You may  resume your normal daily activities it is recommended that you spend the remainder of the day resting -  avoid any strenuous activity.    CALL M.D.  ANY SIGN OF:   Increasing pain, nausea, vomiting  Abdominal distension (swelling)  New increased bleeding (oral or rectal)  Fever (chills)  Pain in chest area  Bloody discharge from nose or mouth  Shortness of breath      Follow-up Instructions:   Call Dr. Lonny Greenberg for any questions or problems at [phone]   High fiber diet   Avoid NSAIDS for next 3 days       ENDOSCOPY FINDINGS:   Your colonoscopy showed one polyp removed and diverticulosis, rest of exam was normal.  Telephone # [phone]      Signed By: Lonny Greenberg MD     3/7/2024  12:12 PM

## 2024-08-12 ENCOUNTER — HOSPITAL ENCOUNTER (OUTPATIENT)
Facility: HOSPITAL | Age: 69
Discharge: HOME | End: 2024-08-15
Attending: INTERNAL MEDICINE
Payer: MEDICARE

## 2024-08-12 DIAGNOSIS — F17.210 CIGARETTE SMOKER: ICD-10-CM

## 2024-08-12 DIAGNOSIS — Z87.891 PERSONAL HISTORY OF TOBACCO USE: ICD-10-CM

## 2024-08-12 PROCEDURE — 71271 CT THORAX LUNG CANCER SCR C-: CPT

## 2024-10-23 ENCOUNTER — TRANSCRIBE ORDERS (OUTPATIENT)
Facility: HOSPITAL | Age: 69
End: 2024-10-23

## 2024-10-23 DIAGNOSIS — F17.211 CIGARETTE NICOTINE DEPENDENCE IN REMISSION: ICD-10-CM

## 2024-10-23 DIAGNOSIS — Z87.891 PERSONAL HISTORY OF TOBACCO USE, PRESENTING HAZARDS TO HEALTH: Primary | ICD-10-CM

## 2025-08-24 ENCOUNTER — HOSPITAL ENCOUNTER (OUTPATIENT)
Facility: HOSPITAL | Age: 70
Discharge: HOME | End: 2025-08-27
Attending: INTERNAL MEDICINE
Payer: MEDICARE

## 2025-08-24 DIAGNOSIS — Z87.891 PERSONAL HISTORY OF TOBACCO USE, PRESENTING HAZARDS TO HEALTH: ICD-10-CM

## 2025-08-24 DIAGNOSIS — F17.211 CIGARETTE NICOTINE DEPENDENCE IN REMISSION: ICD-10-CM

## 2025-08-24 PROCEDURE — 71271 CT THORAX LUNG CANCER SCR C-: CPT

## (undated) DEVICE — GLOVE ORANGE PI 7 1/2   MSG9075

## (undated) DEVICE — SUTURE VCRL SZ 2-0 L27IN ABSRB UD L26MM CT-2 1/2 CIR J269H

## (undated) DEVICE — GLOVE ORTHO 8   MSG9480

## (undated) DEVICE — 3M™ TEGADERM™ TRANSPARENT FILM DRESSING FRAME STYLE, 1624W, 2-3/8 IN X 2-3/4 IN (6 CM X 7 CM), 100/CT 4CT/CASE: Brand: 3M™ TEGADERM™

## (undated) DEVICE — LAMINECTOMY-SFMC: Brand: MEDLINE INDUSTRIES, INC.

## (undated) DEVICE — TOTAL TRAY, 16FR 10ML SIL FOLEY, URN: Brand: MEDLINE

## (undated) DEVICE — SPONGE GZ W4XL4IN COT 12 PLY TYP VII WVN C FLD DSGN STERILE

## (undated) DEVICE — Device: Brand: JELCO

## (undated) DEVICE — SEALANT HEMOSTAT W/THROM 8ML -- SURGIFLO MATRIX

## (undated) DEVICE — ELECTRODE PT RET AD L9FT HI MOIST COND ADH HYDRGEL CORDED

## (undated) DEVICE — ADHESIVE SKIN CLSR 0.7ML TOP DERMBND ADV

## (undated) DEVICE — CATHETER IV 14GA L1.25IN TEF STR HUB INTROCAN SFTY

## (undated) DEVICE — ACCY PA100-A LEGEND LUB/DIFFUSER 4 PACK: Brand: MIDAS REX®

## (undated) DEVICE — SUTURE ABSORBABLE 3-0 PS-1 18 IN UD MONOCRYL + STRATAFIX SXMP1B102

## (undated) DEVICE — SUTURE STRATAFIX SPRL SZ 1 L14IN ABSRB VLT L48CM CTX 1/2 SXPD2B405

## (undated) DEVICE — SUTURE VCRL SZ 2-0 L36IN ABSRB UD L36MM CT-1 1/2 CIR J945H

## (undated) DEVICE — GLOVE SURG SZ 8 L12IN FNGR THK79MIL GRN LTX FREE

## (undated) DEVICE — PROBE BALL TIP STIMULATING 25

## (undated) DEVICE — GLOVE SURG SZ 65 THK91MIL LTX FREE SYN POLYISOPRENE

## (undated) DEVICE — SNARE ENDOSCP M L240CM W27MM SHTH DIA2.4MM CHN 2.8MM OVL

## (undated) DEVICE — SUTURE VCRL SZ 0 L36IN ABSRB UD L36MM CT-1 1/2 CIR J946H

## (undated) DEVICE — 4-PORT MANIFOLD: Brand: NEPTUNE 2

## (undated) DEVICE — GLOVE SURG SZ 7 L12IN FNGR THK79MIL GRN LTX FREE

## (undated) DEVICE — STRAP,POSITIONING,KNEE/BODY,FOAM,4X60": Brand: MEDLINE

## (undated) DEVICE — SOLUTION IRRIG 1000ML 0.9% SOD CHL USP POUR PLAS BTL

## (undated) DEVICE — TIP CLEANER: Brand: VALLEYLAB

## (undated) DEVICE — 450 ML BOTTLE OF 0.05% CHLORHEXIDINE GLUCONATE IN 99.95% STERILE WATER FOR IRRIGATION, USP AND APPLICATOR.: Brand: IRRISEPT ANTIMICROBIAL WOUND LAVAGE

## (undated) DEVICE — AEGIS 1" DISK 4MM HOLE, PEEL OPEN: Brand: MEDLINE

## (undated) DEVICE — THE MILL DISPOSABLE - MEDIUM

## (undated) DEVICE — TOOL 14MH30 LEGEND 14CM 3MM: Brand: MIDAS REX ™

## (undated) DEVICE — GOWN,SIRUS,NONRNF,SETINSLV,XL,20/CS: Brand: MEDLINE

## (undated) DEVICE — SOLUTION IRRIG 1000ML STRL H2O USP PLAS POUR BTL

## (undated) DEVICE — SUTURE VCRL SZ 1 L36IN ABSRB UD L36MM CT-1 1/2 CIR J947H

## (undated) DEVICE — DRAIN KT WND 10FR RND 400ML --

## (undated) DEVICE — JACKSON TABLE POSITIONER KIT: Brand: MEDLINE INDUSTRIES, INC.

## (undated) DEVICE — TRAP SURG QUAD PARABOLA SLOT DSGN SFTY SCRN TRAPEASE

## (undated) DEVICE — 3M™ TEGADERM™ TRANSPARENT FILM DRESSING FRAME STYLE, 1626, 4 IN X 4-3/4 IN (10 CM X 12 CM), 50/CT 4CT/CASE: Brand: 3M™ TEGADERM™

## (undated) DEVICE — OPTIFOAM GENTLE SA, POSTOP, 4X10: Brand: MEDLINE

## (undated) DEVICE — ALCOHOL RUBBING ISO 16OZ 70%